# Patient Record
Sex: FEMALE | Race: OTHER | NOT HISPANIC OR LATINO
[De-identification: names, ages, dates, MRNs, and addresses within clinical notes are randomized per-mention and may not be internally consistent; named-entity substitution may affect disease eponyms.]

---

## 2022-02-09 PROBLEM — Z00.00 ENCOUNTER FOR PREVENTIVE HEALTH EXAMINATION: Status: ACTIVE | Noted: 2022-02-09

## 2022-02-10 ENCOUNTER — APPOINTMENT (OUTPATIENT)
Dept: OTOLARYNGOLOGY | Facility: CLINIC | Age: 77
End: 2022-02-10
Payer: MEDICARE

## 2022-02-10 VITALS
DIASTOLIC BLOOD PRESSURE: 82 MMHG | BODY MASS INDEX: 29.27 KG/M2 | WEIGHT: 155 LBS | HEIGHT: 61 IN | HEART RATE: 84 BPM | SYSTOLIC BLOOD PRESSURE: 145 MMHG

## 2022-02-10 DIAGNOSIS — Z86.79 PERSONAL HISTORY OF OTHER DISEASES OF THE CIRCULATORY SYSTEM: ICD-10-CM

## 2022-02-10 DIAGNOSIS — Z86.39 PERSONAL HISTORY OF OTHER ENDOCRINE, NUTRITIONAL AND METABOLIC DISEASE: ICD-10-CM

## 2022-02-10 DIAGNOSIS — Z78.9 OTHER SPECIFIED HEALTH STATUS: ICD-10-CM

## 2022-02-10 PROCEDURE — 99203 OFFICE O/P NEW LOW 30 MIN: CPT | Mod: 25

## 2022-02-10 PROCEDURE — 92557 COMPREHENSIVE HEARING TEST: CPT

## 2022-02-10 PROCEDURE — 92567 TYMPANOMETRY: CPT

## 2022-02-10 PROCEDURE — G0268 REMOVAL OF IMPACTED WAX MD: CPT

## 2022-02-10 RX ORDER — DAPAGLIFLOZIN 10 MG/1
10 TABLET, FILM COATED ORAL
Refills: 0 | Status: ACTIVE | COMMUNITY

## 2022-02-10 RX ORDER — ATORVASTATIN CALCIUM 20 MG/1
20 TABLET, FILM COATED ORAL
Refills: 0 | Status: ACTIVE | COMMUNITY

## 2022-02-10 RX ORDER — METFORMIN HYDROCHLORIDE 1000 MG/1
1000 TABLET, COATED ORAL
Refills: 0 | Status: ACTIVE | COMMUNITY

## 2022-02-10 NOTE — HISTORY OF PRESENT ILLNESS
[de-identified] : Camila Malloy is a 75 yo female with hx HTN and DM who presents for evaluation of hearing loss. She notes gradually worsening hearing loss over the past two years. She does not wear hearing aids. She denies tinnitus or vertigo. She denies otalgia or otorrhea. She denies history of recurrent ear infections. She denies recent fevers or chills.

## 2022-02-10 NOTE — ASSESSMENT
[FreeTextEntry1] : Camila Malloy presents for hearing evaluation. Bilateral cerumen impaction was noted and removed. Audiogram showed type C tymps AU and essentially moderate to moderately severe SNHL. There was a slight mixed component AU at 250 Hz. Amplificatoin was recommended and the patient would like to proceed with hearing aid evaluation. Will schedule this. Will also start flonase for eustachian tube dysfunction given her type C tymps.\par \par - Flonase 2 sprays daily to each nostril for 1 month.\par - Hearing aid evaluation.\par - Follow up in 1 year for surveillance audio.\par

## 2022-02-10 NOTE — DATA REVIEWED
[de-identified] : C/O: DIMINISHED HEARING \par -TYMPS: TYPE C AU\par -ESSENTIALLY MODERATE TO MOD-SEVERE SNHL 250-8000 HZ AU (SLIGHT MIXED COMPONENT  HZ AU)\par -POOR WRS AU\par RECS: 1) ENT F/U 2)RE-EVAL IN CONJUNCTION W/ MEDICAL MANAGEMENT 3)TRIAL WITH BINAURAL AMPLIFICATION STRONGLY RECOMMENDED

## 2022-02-10 NOTE — PHYSICAL EXAM
[Midline] : trachea located in midline position [Normal] : no rashes [de-identified] : Bilateral external ears wnl. Bilateral cerumen impaction.

## 2022-03-11 ENCOUNTER — APPOINTMENT (OUTPATIENT)
Dept: PHARMACY | Facility: CLINIC | Age: 77
End: 2022-03-11

## 2022-03-17 ENCOUNTER — APPOINTMENT (OUTPATIENT)
Dept: PHARMACY | Facility: CLINIC | Age: 77
End: 2022-03-17
Payer: SELF-PAY

## 2022-03-17 PROCEDURE — V5010 ASSESSMENT FOR HEARING AID: CPT | Mod: NC

## 2022-05-11 ENCOUNTER — APPOINTMENT (OUTPATIENT)
Dept: PHARMACY | Facility: CLINIC | Age: 77
End: 2022-05-11
Payer: COMMERCIAL

## 2022-05-11 PROCEDURE — V5261J: CUSTOM

## 2022-05-26 ENCOUNTER — APPOINTMENT (OUTPATIENT)
Dept: PHARMACY | Facility: CLINIC | Age: 77
End: 2022-05-26
Payer: SELF-PAY

## 2022-05-26 PROCEDURE — V5299A: CUSTOM | Mod: NC

## 2022-05-27 ENCOUNTER — APPOINTMENT (OUTPATIENT)
Dept: OTOLARYNGOLOGY | Facility: CLINIC | Age: 77
End: 2022-05-27
Payer: MEDICARE

## 2022-05-27 VITALS
DIASTOLIC BLOOD PRESSURE: 74 MMHG | HEART RATE: 98 BPM | HEIGHT: 61 IN | SYSTOLIC BLOOD PRESSURE: 138 MMHG | WEIGHT: 155 LBS | BODY MASS INDEX: 29.27 KG/M2

## 2022-05-27 PROCEDURE — G0268 REMOVAL OF IMPACTED WAX MD: CPT

## 2022-05-27 PROCEDURE — 99212 OFFICE O/P EST SF 10 MIN: CPT | Mod: 25

## 2022-05-27 RX ORDER — FAMOTIDINE 20 MG
TABLET ORAL
Refills: 0 | Status: COMPLETED | COMMUNITY
End: 2022-05-27

## 2022-05-27 NOTE — HISTORY OF PRESENT ILLNESS
[de-identified] : Camila Malloy is a 75 yo female with hx HTN and DM who presents for evaluation of hearing loss. She notes gradually worsening hearing loss over the past two years. She does not wear hearing aids. She denies tinnitus or vertigo. She denies otalgia or otorrhea. She denies history of recurrent ear infections. She denies recent fevers or chills. [FreeTextEntry1] : 5/27/22 - Ms. Malloy presents for follow-up. Since last visit, she obtained bilateral hearing aids. She has been seen by audiology and was noted to have cerumen impaction. She notes that she is having issues with her right hearing aid and feels decreased benefit from it. She denies otalgia, otorrhea, tinnitus, or vertigo. She dneies fevers or chills.

## 2022-05-27 NOTE — PHYSICAL EXAM
[de-identified] : Bilateral external ears wnl. Bilateral cerumen impaction. Wearing bilateral hearing aids. [Midline] : trachea located in midline position [Normal] : no rashes

## 2022-05-27 NOTE — ASSESSMENT
[FreeTextEntry1] : Camila Malloy presents for follow-up. At last visit, she was diagnosed with bilateral sensorineural hearing loss and eustachian tube dysfunction. She has obtained bilateral hearing aids. Bilateral cerumen was removed today. She will make an appointment for repeat hearing aid check.\par \par - Follow up in 3 months.

## 2022-07-26 ENCOUNTER — APPOINTMENT (OUTPATIENT)
Dept: OTOLARYNGOLOGY | Facility: CLINIC | Age: 77
End: 2022-07-26

## 2022-07-26 VITALS
WEIGHT: 146 LBS | HEART RATE: 96 BPM | DIASTOLIC BLOOD PRESSURE: 68 MMHG | BODY MASS INDEX: 27.21 KG/M2 | HEIGHT: 61.5 IN | SYSTOLIC BLOOD PRESSURE: 132 MMHG

## 2022-07-26 PROCEDURE — 99212 OFFICE O/P EST SF 10 MIN: CPT | Mod: 25

## 2022-07-26 PROCEDURE — G0268 REMOVAL OF IMPACTED WAX MD: CPT

## 2022-07-26 RX ORDER — CYCLOSPORINE 0.5 MG/ML
0.05 EMULSION OPHTHALMIC
Qty: 180 | Refills: 0 | Status: ACTIVE | COMMUNITY
Start: 2021-11-01

## 2022-07-26 RX ORDER — ORAL SEMAGLUTIDE 7 MG/1
7 TABLET ORAL
Qty: 90 | Refills: 0 | Status: ACTIVE | COMMUNITY
Start: 2021-06-02

## 2022-07-26 RX ORDER — AMLODIPINE AND OLMESARTAN MEDOXOMIL 5; 40 MG/1; MG/1
5-40 TABLET ORAL
Qty: 90 | Refills: 0 | Status: ACTIVE | COMMUNITY
Start: 2022-02-05

## 2022-07-26 RX ORDER — IBANDRONATE SODIUM 150 MG/1
150 TABLET ORAL
Qty: 3 | Refills: 0 | Status: ACTIVE | COMMUNITY
Start: 2021-12-15

## 2022-07-26 RX ORDER — MELOXICAM 15 MG/1
15 TABLET ORAL
Qty: 30 | Refills: 0 | Status: ACTIVE | COMMUNITY
Start: 2022-07-25

## 2022-07-26 RX ORDER — ERYTHROMYCIN 5 MG/G
5 OINTMENT OPHTHALMIC
Qty: 4 | Refills: 0 | Status: ACTIVE | COMMUNITY
Start: 2022-06-27

## 2022-07-26 RX ORDER — SITAGLIPTIN 100 MG/1
100 TABLET, FILM COATED ORAL
Qty: 90 | Refills: 0 | Status: ACTIVE | COMMUNITY
Start: 2021-11-17

## 2022-07-26 RX ORDER — ATORVASTATIN CALCIUM 10 MG/1
10 TABLET, FILM COATED ORAL
Qty: 90 | Refills: 0 | Status: ACTIVE | COMMUNITY
Start: 2022-03-16

## 2022-07-26 RX ORDER — METFORMIN ER 500 MG 500 MG/1
500 TABLET ORAL
Qty: 360 | Refills: 0 | Status: ACTIVE | COMMUNITY
Start: 2022-07-25

## 2022-07-26 NOTE — HISTORY OF PRESENT ILLNESS
[de-identified] : Camila Malloy is a 75 yo female with hx HTN and DM who presents for evaluation of hearing loss. She notes gradually worsening hearing loss over the past two years. She does not wear hearing aids. She denies tinnitus or vertigo. She denies otalgia or otorrhea. She denies history of recurrent ear infections. She denies recent fevers or chills. [FreeTextEntry1] : 5/27/22 - Ms. Malloy presents for follow-up. Since last visit, she obtained bilateral hearing aids. She has been seen by audiology and was noted to have cerumen impaction. She notes that she is having issues with her right hearing aid and feels decreased benefit from it. She denies otalgia, otorrhea, tinnitus, or vertigo. She dneies fevers or chills.\par \par 7/26/22 - Ms. Malloy presents for follow-up. She is wearing her hearing aids. She denies otalgia, otorrhea, tinnitus, vertigo, hearing change. She denies fevers, chills, or recent ear infections.

## 2022-07-26 NOTE — ASSESSMENT
[FreeTextEntry1] : Camila Malloy presents for follow-up. She has known bilateral sensorineural hearing loss and eustachian tube dysfunction. Bilateral cerumen was removed today. She is wearing her hearing aids and will go for hearing aid check.\par \par - Hearing aid check.\par - Follow up in 3 months.

## 2022-07-26 NOTE — PHYSICAL EXAM
[de-identified] : Bilateral external ears wnl. Bilateral cerumen impaction. Wearing bilateral hearing aids. [Midline] : trachea located in midline position [Normal] : no rashes

## 2022-08-01 ENCOUNTER — APPOINTMENT (OUTPATIENT)
Dept: PHARMACY | Facility: CLINIC | Age: 77
End: 2022-08-01

## 2022-08-01 PROCEDURE — V5267B: CUSTOM

## 2022-08-01 PROCEDURE — V5265A: CUSTOM

## 2022-08-18 ENCOUNTER — APPOINTMENT (OUTPATIENT)
Dept: PHARMACY | Facility: CLINIC | Age: 77
End: 2022-08-18

## 2022-08-18 PROCEDURE — V5267B: CUSTOM

## 2022-08-18 PROCEDURE — V5265A: CUSTOM

## 2022-08-19 ENCOUNTER — APPOINTMENT (OUTPATIENT)
Dept: OTOLARYNGOLOGY | Facility: CLINIC | Age: 77
End: 2022-08-19

## 2022-09-01 ENCOUNTER — APPOINTMENT (OUTPATIENT)
Dept: PHARMACY | Facility: CLINIC | Age: 77
End: 2022-09-01

## 2022-09-01 PROCEDURE — V5265A: CUSTOM

## 2022-09-01 PROCEDURE — V5267D: CUSTOM

## 2022-09-29 ENCOUNTER — APPOINTMENT (OUTPATIENT)
Dept: PHARMACY | Facility: CLINIC | Age: 77
End: 2022-09-29

## 2022-09-29 PROCEDURE — V5265A: CUSTOM

## 2022-09-29 PROCEDURE — V5267D: CUSTOM

## 2022-10-26 ENCOUNTER — APPOINTMENT (OUTPATIENT)
Dept: OTOLARYNGOLOGY | Facility: CLINIC | Age: 77
End: 2022-10-26

## 2022-10-26 ENCOUNTER — APPOINTMENT (OUTPATIENT)
Dept: PHARMACY | Facility: CLINIC | Age: 77
End: 2022-10-26

## 2022-10-26 VITALS
BODY MASS INDEX: 25.53 KG/M2 | HEART RATE: 91 BPM | DIASTOLIC BLOOD PRESSURE: 67 MMHG | SYSTOLIC BLOOD PRESSURE: 112 MMHG | HEIGHT: 61.5 IN | WEIGHT: 137 LBS

## 2022-10-26 DIAGNOSIS — T16.2XXA FOREIGN BODY IN LEFT EAR, INITIAL ENCOUNTER: ICD-10-CM

## 2022-10-26 PROCEDURE — V5265A: CUSTOM

## 2022-10-26 PROCEDURE — V5267D: CUSTOM

## 2022-10-26 PROCEDURE — 99213 OFFICE O/P EST LOW 20 MIN: CPT | Mod: 25

## 2022-10-26 PROCEDURE — 69200 CLEAR OUTER EAR CANAL: CPT

## 2022-10-26 NOTE — ASSESSMENT
[FreeTextEntry1] : Camila Malloy presents for follow-up. She has history of bilateral sensorineural hearing loss and wears hearing aids. Bilateral cerumen was removed today. Left ear foreign body was removed from external auditory canal, found to be a hearing aid bud. She will see audiology regarding her hearing aid issues.\par \par - Follow up in 3 months. Audio at that time.

## 2022-10-26 NOTE — PHYSICAL EXAM
[de-identified] : Bilateral external ears wnl. Wearing bilateral hearing aids. Bilateral cerumen impaction. Cerumen removed. Clear foreign body in left EAC.  [Midline] : trachea located in midline position [Normal] : no rashes

## 2022-10-26 NOTE — HISTORY OF PRESENT ILLNESS
[de-identified] : Camila Malloy is a 77 yo female with hx HTN and DM who presents for evaluation of hearing loss. She notes gradually worsening hearing loss over the past two years. She does not wear hearing aids. She denies tinnitus or vertigo. She denies otalgia or otorrhea. She denies history of recurrent ear infections. She denies recent fevers or chills. [FreeTextEntry1] : 5/27/22 - Ms. Malloy presents for follow-up. Since last visit, she obtained bilateral hearing aids. She has been seen by audiology and was noted to have cerumen impaction. She notes that she is having issues with her right hearing aid and feels decreased benefit from it. She denies otalgia, otorrhea, tinnitus, or vertigo. She dneies fevers or chills.\par \par 7/26/22 - Ms. Malloy presents for follow-up. She is wearing her hearing aids. She denies otalgia, otorrhea, tinnitus, vertigo, hearing change. She denies fevers, chills, or recent ear infections.\par \par 10/26/22 - Ms. Malloy presents for follow-up. She notes occasional issues with her hearing aids. She is going for hearing aid check. She notes difficulty hearing in crowded rooms. She denies otalgia, otorrhea, tinnitus ,vertigo. She denies fevers, chills, or recent ear infections.

## 2023-02-22 ENCOUNTER — APPOINTMENT (OUTPATIENT)
Dept: OTOLARYNGOLOGY | Facility: CLINIC | Age: 78
End: 2023-02-22
Payer: MEDICARE

## 2023-02-22 ENCOUNTER — APPOINTMENT (OUTPATIENT)
Dept: PHARMACY | Facility: CLINIC | Age: 78
End: 2023-02-22
Payer: MEDICARE

## 2023-02-22 VITALS
SYSTOLIC BLOOD PRESSURE: 149 MMHG | BODY MASS INDEX: 25.49 KG/M2 | DIASTOLIC BLOOD PRESSURE: 77 MMHG | HEART RATE: 85 BPM | HEIGHT: 61 IN | WEIGHT: 135 LBS

## 2023-02-22 PROCEDURE — V5267D: CUSTOM | Mod: GY

## 2023-02-22 PROCEDURE — V5265A: CUSTOM | Mod: GY

## 2023-02-22 PROCEDURE — 92567 TYMPANOMETRY: CPT

## 2023-02-22 PROCEDURE — 99213 OFFICE O/P EST LOW 20 MIN: CPT | Mod: 25

## 2023-02-22 PROCEDURE — G0268 REMOVAL OF IMPACTED WAX MD: CPT

## 2023-02-22 PROCEDURE — V5299A: CUSTOM | Mod: NC

## 2023-02-22 PROCEDURE — 92557 COMPREHENSIVE HEARING TEST: CPT

## 2023-02-22 NOTE — HISTORY OF PRESENT ILLNESS
[de-identified] : Camila Malloy is a 75 yo female with hx HTN and DM who presents for evaluation of hearing loss. She notes gradually worsening hearing loss over the past two years. She does not wear hearing aids. She denies tinnitus or vertigo. She denies otalgia or otorrhea. She denies history of recurrent ear infections. She denies recent fevers or chills. [FreeTextEntry1] : 5/27/22 - Ms. Malloy presents for follow-up. Since last visit, she obtained bilateral hearing aids. She has been seen by audiology and was noted to have cerumen impaction. She notes that she is having issues with her right hearing aid and feels decreased benefit from it. She denies otalgia, otorrhea, tinnitus, or vertigo. She dneies fevers or chills.\par \par 7/26/22 - Ms. Malloy presents for follow-up. She is wearing her hearing aids. She denies otalgia, otorrhea, tinnitus, vertigo, hearing change. She denies fevers, chills, or recent ear infections.\par \par 10/26/22 - Ms. Malloy presents for follow-up. She notes occasional issues with her hearing aids. She is going for hearing aid check. She notes difficulty hearing in crowded rooms. She denies otalgia, otorrhea, tinnitus ,vertigo. She denies fevers, chills, or recent ear infections.\par \par 2/22/23 - Ms. Malloy presnets for follow-up. She continues to wear her hearing aids. She notes some left ear soreness today, otherwise no otalgia. She denies otorrhea, tinnitus, vertigo. She denies hearing change. She denies fevers, chills, or recent ear infections.

## 2023-02-22 NOTE — ASSESSMENT
[FreeTextEntry1] : Camila Malloy presents for follow-up. She wears bilateral hearing aids. Bilateral cerumen impaction was removed today. Audiogram was performed and reviewed today showing type C tymps AU and moderately severe to moderate sensorineural hearing loss AU, stable from prior. She will go for hearing aid check today.\par \par - Follow up in 6 months. Audio in 1 year.\par \par

## 2023-02-22 NOTE — PHYSICAL EXAM
[Midline] : trachea located in midline position [Normal] : no rashes [de-identified] : Wearing bilateral hearing aids. Bilateral cerumen impaction.removed

## 2023-02-28 ENCOUNTER — APPOINTMENT (OUTPATIENT)
Dept: PHARMACY | Facility: CLINIC | Age: 78
End: 2023-02-28

## 2023-05-22 ENCOUNTER — APPOINTMENT (OUTPATIENT)
Dept: PHARMACY | Facility: CLINIC | Age: 78
End: 2023-05-22

## 2023-06-21 ENCOUNTER — APPOINTMENT (OUTPATIENT)
Dept: PHARMACY | Facility: CLINIC | Age: 78
End: 2023-06-21
Payer: SELF-PAY

## 2023-06-21 PROCEDURE — V5265A: CUSTOM

## 2023-06-21 PROCEDURE — V5267D: CUSTOM

## 2023-08-22 ENCOUNTER — APPOINTMENT (OUTPATIENT)
Dept: OTOLARYNGOLOGY | Facility: CLINIC | Age: 78
End: 2023-08-22
Payer: MEDICARE

## 2023-08-22 ENCOUNTER — APPOINTMENT (OUTPATIENT)
Dept: PHARMACY | Facility: CLINIC | Age: 78
End: 2023-08-22
Payer: MEDICARE

## 2023-08-22 VITALS
WEIGHT: 134 LBS | HEIGHT: 61 IN | SYSTOLIC BLOOD PRESSURE: 124 MMHG | DIASTOLIC BLOOD PRESSURE: 76 MMHG | HEART RATE: 81 BPM | BODY MASS INDEX: 25.3 KG/M2

## 2023-08-22 PROCEDURE — 99212 OFFICE O/P EST SF 10 MIN: CPT | Mod: 25

## 2023-08-22 PROCEDURE — 69210 REMOVE IMPACTED EAR WAX UNI: CPT

## 2023-08-22 PROCEDURE — V5299A: CUSTOM

## 2023-08-22 NOTE — ASSESSMENT
[FreeTextEntry1] : Camila Malloy presents for follow-up. She wears bilateral hearing aids. Bilateral cerumen impaction was removed today. Audiogram at last visit showed type C tymps AU and moderately severe to moderate sensorineural hearing loss AU, stable from prior. She has appointment for hearing aid check today.  - Follow up in 6 months w/ audio.

## 2023-08-22 NOTE — PHYSICAL EXAM
[de-identified] : Wearing bilateral hearing aids. Bilateral cerumen impaction [Midline] : trachea located in midline position [Normal] : no rashes

## 2023-08-22 NOTE — HISTORY OF PRESENT ILLNESS
[de-identified] : Camila Malloy is a 77 yo female with hx HTN and DM who presents for evaluation of hearing loss. She notes gradually worsening hearing loss over the past two years. She does not wear hearing aids. She denies tinnitus or vertigo. She denies otalgia or otorrhea. She denies history of recurrent ear infections. She denies recent fevers or chills. [FreeTextEntry1] : 5/27/22 - Ms. Malloy presents for follow-up. Since last visit, she obtained bilateral hearing aids. She has been seen by audiology and was noted to have cerumen impaction. She notes that she is having issues with her right hearing aid and feels decreased benefit from it. She denies otalgia, otorrhea, tinnitus, or vertigo. She dneies fevers or chills.  7/26/22 - Ms. Malloy presents for follow-up. She is wearing her hearing aids. She denies otalgia, otorrhea, tinnitus, vertigo, hearing change. She denies fevers, chills, or recent ear infections.  10/26/22 - Ms. Malloy presents for follow-up. She notes occasional issues with her hearing aids. She is going for hearing aid check. She notes difficulty hearing in crowded rooms. She denies otalgia, otorrhea, tinnitus ,vertigo. She denies fevers, chills, or recent ear infections.  2/22/23 - Ms. Malloy presnets for follow-up. She continues to wear her hearing aids. She notes some left ear soreness today, otherwise no otalgia. She denies otorrhea, tinnitus, vertigo. She denies hearing change. She denies fevers, chills, or recent ear infections.  8/22/23 - Camila Malloy presents for follow-up. She is wearing her hearing aids. She notes some bilateral tinnitus. She denies otalgia, otorrhea, vertigo, or change in hearing. No fevers or chills. No recent ear infections.

## 2023-09-20 ENCOUNTER — APPOINTMENT (OUTPATIENT)
Dept: PHARMACY | Facility: CLINIC | Age: 78
End: 2023-09-20
Payer: SELF-PAY

## 2023-09-20 PROCEDURE — V5265A: CUSTOM

## 2023-10-13 ENCOUNTER — OUTPATIENT (OUTPATIENT)
Dept: OUTPATIENT SERVICES | Facility: HOSPITAL | Age: 78
LOS: 1 days | End: 2023-10-13
Payer: MEDICARE

## 2023-10-13 VITALS
HEART RATE: 90 BPM | TEMPERATURE: 99 F | RESPIRATION RATE: 16 BRPM | DIASTOLIC BLOOD PRESSURE: 60 MMHG | OXYGEN SATURATION: 96 % | SYSTOLIC BLOOD PRESSURE: 120 MMHG | WEIGHT: 134.04 LBS

## 2023-10-13 DIAGNOSIS — Z98.49 CATARACT EXTRACTION STATUS, UNSPECIFIED EYE: Chronic | ICD-10-CM

## 2023-10-13 DIAGNOSIS — Z01.818 ENCOUNTER FOR OTHER PREPROCEDURAL EXAMINATION: ICD-10-CM

## 2023-10-13 DIAGNOSIS — M17.11 UNILATERAL PRIMARY OSTEOARTHRITIS, RIGHT KNEE: ICD-10-CM

## 2023-10-13 DIAGNOSIS — E11.9 TYPE 2 DIABETES MELLITUS WITHOUT COMPLICATIONS: ICD-10-CM

## 2023-10-13 DIAGNOSIS — Z98.89 OTHER SPECIFIED POSTPROCEDURAL STATES: Chronic | ICD-10-CM

## 2023-10-13 LAB
ALBUMIN SERPL ELPH-MCNC: 3.8 G/DL — SIGNIFICANT CHANGE UP (ref 3.3–5)
ALP SERPL-CCNC: 85 U/L — SIGNIFICANT CHANGE UP (ref 40–120)
ALT FLD-CCNC: 23 U/L — SIGNIFICANT CHANGE UP (ref 12–78)
ANION GAP SERPL CALC-SCNC: 4 MMOL/L — LOW (ref 5–17)
APTT BLD: 26.6 SEC — SIGNIFICANT CHANGE UP (ref 24.5–35.6)
AST SERPL-CCNC: 11 U/L — LOW (ref 15–37)
BILIRUB SERPL-MCNC: 0.5 MG/DL — SIGNIFICANT CHANGE UP (ref 0.2–1.2)
BLD GP AB SCN SERPL QL: SIGNIFICANT CHANGE UP
BUN SERPL-MCNC: 15 MG/DL — SIGNIFICANT CHANGE UP (ref 7–23)
CALCIUM SERPL-MCNC: 9.4 MG/DL — SIGNIFICANT CHANGE UP (ref 8.5–10.1)
CHLORIDE SERPL-SCNC: 109 MMOL/L — HIGH (ref 96–108)
CO2 SERPL-SCNC: 29 MMOL/L — SIGNIFICANT CHANGE UP (ref 22–31)
CREAT SERPL-MCNC: 0.72 MG/DL — SIGNIFICANT CHANGE UP (ref 0.5–1.3)
EGFR: 87 ML/MIN/1.73M2 — SIGNIFICANT CHANGE UP
GLUCOSE SERPL-MCNC: 189 MG/DL — HIGH (ref 70–99)
HCT VFR BLD CALC: 42.2 % — SIGNIFICANT CHANGE UP (ref 34.5–45)
HGB BLD-MCNC: 13.6 G/DL — SIGNIFICANT CHANGE UP (ref 11.5–15.5)
INR BLD: 0.94 RATIO — SIGNIFICANT CHANGE UP (ref 0.85–1.18)
MCHC RBC-ENTMCNC: 28.5 PG — SIGNIFICANT CHANGE UP (ref 27–34)
MCHC RBC-ENTMCNC: 32.2 GM/DL — SIGNIFICANT CHANGE UP (ref 32–36)
MCV RBC AUTO: 88.5 FL — SIGNIFICANT CHANGE UP (ref 80–100)
NRBC # BLD: 0 /100 WBCS — SIGNIFICANT CHANGE UP (ref 0–0)
PLATELET # BLD AUTO: 272 K/UL — SIGNIFICANT CHANGE UP (ref 150–400)
POTASSIUM SERPL-MCNC: 3.6 MMOL/L — SIGNIFICANT CHANGE UP (ref 3.5–5.3)
POTASSIUM SERPL-SCNC: 3.6 MMOL/L — SIGNIFICANT CHANGE UP (ref 3.5–5.3)
PROT SERPL-MCNC: 7.5 G/DL — SIGNIFICANT CHANGE UP (ref 6–8.3)
PROTHROM AB SERPL-ACNC: 11 SEC — SIGNIFICANT CHANGE UP (ref 9.5–13)
RBC # BLD: 4.77 M/UL — SIGNIFICANT CHANGE UP (ref 3.8–5.2)
RBC # FLD: 15.7 % — HIGH (ref 10.3–14.5)
SODIUM SERPL-SCNC: 142 MMOL/L — SIGNIFICANT CHANGE UP (ref 135–145)
WBC # BLD: 11.46 K/UL — HIGH (ref 3.8–10.5)
WBC # FLD AUTO: 11.46 K/UL — HIGH (ref 3.8–10.5)

## 2023-10-13 PROCEDURE — 85027 COMPLETE CBC AUTOMATED: CPT

## 2023-10-13 PROCEDURE — G0463: CPT

## 2023-10-13 PROCEDURE — 86901 BLOOD TYPING SEROLOGIC RH(D): CPT

## 2023-10-13 PROCEDURE — 73560 X-RAY EXAM OF KNEE 1 OR 2: CPT | Mod: 26,RT

## 2023-10-13 PROCEDURE — 93005 ELECTROCARDIOGRAM TRACING: CPT

## 2023-10-13 PROCEDURE — 93010 ELECTROCARDIOGRAM REPORT: CPT

## 2023-10-13 PROCEDURE — 73560 X-RAY EXAM OF KNEE 1 OR 2: CPT

## 2023-10-13 PROCEDURE — 83036 HEMOGLOBIN GLYCOSYLATED A1C: CPT

## 2023-10-13 PROCEDURE — 36415 COLL VENOUS BLD VENIPUNCTURE: CPT

## 2023-10-13 PROCEDURE — 86850 RBC ANTIBODY SCREEN: CPT

## 2023-10-13 PROCEDURE — 85730 THROMBOPLASTIN TIME PARTIAL: CPT

## 2023-10-13 PROCEDURE — 85610 PROTHROMBIN TIME: CPT

## 2023-10-13 PROCEDURE — 87640 STAPH A DNA AMP PROBE: CPT

## 2023-10-13 PROCEDURE — 80053 COMPREHEN METABOLIC PANEL: CPT

## 2023-10-13 PROCEDURE — 87641 MR-STAPH DNA AMP PROBE: CPT

## 2023-10-13 PROCEDURE — 86900 BLOOD TYPING SEROLOGIC ABO: CPT

## 2023-10-13 RX ORDER — DEXTROSE 50 % IN WATER 50 %
25 SYRINGE (ML) INTRAVENOUS ONCE
Refills: 0 | Status: DISCONTINUED | OUTPATIENT
Start: 2023-10-23 | End: 2023-11-06

## 2023-10-13 RX ORDER — GLUCAGON INJECTION, SOLUTION 0.5 MG/.1ML
1 INJECTION, SOLUTION SUBCUTANEOUS ONCE
Refills: 0 | Status: DISCONTINUED | OUTPATIENT
Start: 2023-10-23 | End: 2023-11-06

## 2023-10-13 RX ORDER — DEXTROSE 50 % IN WATER 50 %
12.5 SYRINGE (ML) INTRAVENOUS ONCE
Refills: 0 | Status: DISCONTINUED | OUTPATIENT
Start: 2023-10-23 | End: 2023-11-06

## 2023-10-13 RX ORDER — DEXTROSE 50 % IN WATER 50 %
15 SYRINGE (ML) INTRAVENOUS ONCE
Refills: 0 | Status: DISCONTINUED | OUTPATIENT
Start: 2023-10-23 | End: 2023-11-06

## 2023-10-13 NOTE — H&P PST ADULT - HISTORY OF PRESENT ILLNESS
79 y/o female with P  Pt complaining of right knee pain for the last 2 years especially going up and down stairs as well ambulating.   Pt diagnosed with OA of right knee and with unsteady and limping gait. Pt denies right knee swelling.  79 y/o female with PMH of Type 2 DM and HTN here for PST. Pt complaining of right knee pain for the last 2 years especially going up and down stairs as well ambulating. Pt diagnosed with OA of right knee and with unsteady and limping gait. Pt denies right knee swelling. Pt electing for right total knee replacement on 10/23/23.  79 y/o female with PMH of Type 2 DM and HTN here for PST. Pt complaining of right knee pain for the last 2 years especially going up and down stairs as well ambulating. Pt diagnosed with OA of right knee with unsteady and limping gait. Pt denies right knee swelling. Pt electing for right total knee replacement on 10/23/23.

## 2023-10-13 NOTE — H&P PST ADULT - NSHP PST DIAGEKG REVIEWED YN_GEN_A_CORE
[FreeTextEntry1] : Wound veil, dry dressing to knee, return to office in two weeks.\par 25 minutes spend with the patient.\par 
Yes

## 2023-10-13 NOTE — H&P PST ADULT - NSANTHOSAYNRD_GEN_A_CORE
No. OFE screening performed.  STOP BANG Legend: 0-2 = LOW Risk; 3-4 = INTERMEDIATE Risk; 5-8 = HIGH Risk

## 2023-10-13 NOTE — H&P PST ADULT - MUSCULOSKELETAL COMMENTS
See HPI, (+) scoliosis Right knee - decreased ROM, (+) tenderness to medial aspect, decreased strength, (+) edema, no erythema

## 2023-10-13 NOTE — H&P PST ADULT - PROBLEM SELECTOR PLAN 3
Medical clearance needed.   CBC, Comprehensive panel, PT/PTT, T&S, HgbA1c, EKG, Nose culture and X-ray of right knee ordered.   Pre-op instructions and 3 days of surgical scrubs given and pt verbalized understanding.

## 2023-10-13 NOTE — H&P PST ADULT - NSICDXPASTMEDICALHX_GEN_ALL_CORE_FT
PAST MEDICAL HISTORY:  Hypertension      PAST MEDICAL HISTORY:  Hypertension     Type 2 diabetes mellitus     Unilateral primary osteoarthritis, right knee

## 2023-10-13 NOTE — H&P PST ADULT - PROBLEM SELECTOR PLAN 2
Instructed pt of need for endocrine clearance if HgbA1c is 9 or greater. No Metformin 24 hours before and morning of surgery. Last Farxiga on 10/20/23. Last dose of Rybelsus on 10/16/23. Pt verbalized understanding.

## 2023-10-14 LAB
A1C WITH ESTIMATED AVERAGE GLUCOSE RESULT: 6.1 % — HIGH (ref 4–5.6)
ESTIMATED AVERAGE GLUCOSE: 128 MG/DL — HIGH (ref 68–114)
MRSA PCR RESULT.: SIGNIFICANT CHANGE UP
S AUREUS DNA NOSE QL NAA+PROBE: SIGNIFICANT CHANGE UP

## 2023-10-18 ENCOUNTER — APPOINTMENT (OUTPATIENT)
Dept: PHARMACY | Facility: CLINIC | Age: 78
End: 2023-10-18
Payer: SELF-PAY

## 2023-10-18 PROBLEM — M17.11 UNILATERAL PRIMARY OSTEOARTHRITIS, RIGHT KNEE: Chronic | Status: ACTIVE | Noted: 2023-10-13

## 2023-10-18 PROBLEM — E11.9 TYPE 2 DIABETES MELLITUS WITHOUT COMPLICATIONS: Chronic | Status: ACTIVE | Noted: 2023-10-13

## 2023-10-18 PROCEDURE — V5267D: CUSTOM

## 2023-10-22 ENCOUNTER — TRANSCRIPTION ENCOUNTER (OUTPATIENT)
Age: 78
End: 2023-10-22

## 2023-10-22 RX ORDER — CELECOXIB 200 MG/1
2 CAPSULE ORAL
Refills: 0 | DISCHARGE
Start: 2023-10-22

## 2023-10-23 ENCOUNTER — OUTPATIENT (OUTPATIENT)
Dept: OUTPATIENT SERVICES | Facility: HOSPITAL | Age: 78
LOS: 1 days | End: 2023-10-23
Payer: MEDICARE

## 2023-10-23 ENCOUNTER — TRANSCRIPTION ENCOUNTER (OUTPATIENT)
Age: 78
End: 2023-10-23

## 2023-10-23 VITALS
WEIGHT: 145.06 LBS | HEART RATE: 82 BPM | OXYGEN SATURATION: 99 % | TEMPERATURE: 98 F | HEIGHT: 61 IN | RESPIRATION RATE: 16 BRPM | SYSTOLIC BLOOD PRESSURE: 162 MMHG | DIASTOLIC BLOOD PRESSURE: 68 MMHG

## 2023-10-23 DIAGNOSIS — M19.90 UNSPECIFIED OSTEOARTHRITIS, UNSPECIFIED SITE: ICD-10-CM

## 2023-10-23 DIAGNOSIS — E78.00 PURE HYPERCHOLESTEROLEMIA, UNSPECIFIED: ICD-10-CM

## 2023-10-23 DIAGNOSIS — Z98.89 OTHER SPECIFIED POSTPROCEDURAL STATES: Chronic | ICD-10-CM

## 2023-10-23 DIAGNOSIS — Z98.49 CATARACT EXTRACTION STATUS, UNSPECIFIED EYE: Chronic | ICD-10-CM

## 2023-10-23 DIAGNOSIS — I10 ESSENTIAL (PRIMARY) HYPERTENSION: ICD-10-CM

## 2023-10-23 DIAGNOSIS — M17.11 UNILATERAL PRIMARY OSTEOARTHRITIS, RIGHT KNEE: ICD-10-CM

## 2023-10-23 DIAGNOSIS — E11.9 TYPE 2 DIABETES MELLITUS WITHOUT COMPLICATIONS: ICD-10-CM

## 2023-10-23 DIAGNOSIS — Z01.818 ENCOUNTER FOR OTHER PREPROCEDURAL EXAMINATION: ICD-10-CM

## 2023-10-23 LAB
ABO RH CONFIRMATION: SIGNIFICANT CHANGE UP
ABO RH CONFIRMATION: SIGNIFICANT CHANGE UP
ANION GAP SERPL CALC-SCNC: 10 MMOL/L — SIGNIFICANT CHANGE UP (ref 5–17)
ANION GAP SERPL CALC-SCNC: 10 MMOL/L — SIGNIFICANT CHANGE UP (ref 5–17)
BUN SERPL-MCNC: 16 MG/DL — SIGNIFICANT CHANGE UP (ref 7–23)
BUN SERPL-MCNC: 16 MG/DL — SIGNIFICANT CHANGE UP (ref 7–23)
CALCIUM SERPL-MCNC: 8.7 MG/DL — SIGNIFICANT CHANGE UP (ref 8.5–10.1)
CALCIUM SERPL-MCNC: 8.7 MG/DL — SIGNIFICANT CHANGE UP (ref 8.5–10.1)
CHLORIDE SERPL-SCNC: 110 MMOL/L — HIGH (ref 96–108)
CHLORIDE SERPL-SCNC: 110 MMOL/L — HIGH (ref 96–108)
CO2 SERPL-SCNC: 23 MMOL/L — SIGNIFICANT CHANGE UP (ref 22–31)
CO2 SERPL-SCNC: 23 MMOL/L — SIGNIFICANT CHANGE UP (ref 22–31)
CREAT SERPL-MCNC: 0.59 MG/DL — SIGNIFICANT CHANGE UP (ref 0.5–1.3)
CREAT SERPL-MCNC: 0.59 MG/DL — SIGNIFICANT CHANGE UP (ref 0.5–1.3)
EGFR: 93 ML/MIN/1.73M2 — SIGNIFICANT CHANGE UP
EGFR: 93 ML/MIN/1.73M2 — SIGNIFICANT CHANGE UP
GLUCOSE BLDC GLUCOMTR-MCNC: 121 MG/DL — HIGH (ref 70–99)
GLUCOSE BLDC GLUCOMTR-MCNC: 121 MG/DL — HIGH (ref 70–99)
GLUCOSE BLDC GLUCOMTR-MCNC: 163 MG/DL — HIGH (ref 70–99)
GLUCOSE BLDC GLUCOMTR-MCNC: 163 MG/DL — HIGH (ref 70–99)
GLUCOSE BLDC GLUCOMTR-MCNC: 172 MG/DL — HIGH (ref 70–99)
GLUCOSE BLDC GLUCOMTR-MCNC: 172 MG/DL — HIGH (ref 70–99)
GLUCOSE BLDC GLUCOMTR-MCNC: 222 MG/DL — HIGH (ref 70–99)
GLUCOSE BLDC GLUCOMTR-MCNC: 222 MG/DL — HIGH (ref 70–99)
GLUCOSE SERPL-MCNC: 239 MG/DL — HIGH (ref 70–99)
GLUCOSE SERPL-MCNC: 239 MG/DL — HIGH (ref 70–99)
HCT VFR BLD CALC: 40.9 % — SIGNIFICANT CHANGE UP (ref 34.5–45)
HCT VFR BLD CALC: 40.9 % — SIGNIFICANT CHANGE UP (ref 34.5–45)
HGB BLD-MCNC: 13 G/DL — SIGNIFICANT CHANGE UP (ref 11.5–15.5)
HGB BLD-MCNC: 13 G/DL — SIGNIFICANT CHANGE UP (ref 11.5–15.5)
MCHC RBC-ENTMCNC: 28.4 PG — SIGNIFICANT CHANGE UP (ref 27–34)
MCHC RBC-ENTMCNC: 28.4 PG — SIGNIFICANT CHANGE UP (ref 27–34)
MCHC RBC-ENTMCNC: 31.8 GM/DL — LOW (ref 32–36)
MCHC RBC-ENTMCNC: 31.8 GM/DL — LOW (ref 32–36)
MCV RBC AUTO: 89.3 FL — SIGNIFICANT CHANGE UP (ref 80–100)
MCV RBC AUTO: 89.3 FL — SIGNIFICANT CHANGE UP (ref 80–100)
NRBC # BLD: 0 /100 WBCS — SIGNIFICANT CHANGE UP (ref 0–0)
NRBC # BLD: 0 /100 WBCS — SIGNIFICANT CHANGE UP (ref 0–0)
PLATELET # BLD AUTO: 268 K/UL — SIGNIFICANT CHANGE UP (ref 150–400)
PLATELET # BLD AUTO: 268 K/UL — SIGNIFICANT CHANGE UP (ref 150–400)
POTASSIUM SERPL-MCNC: 3.6 MMOL/L — SIGNIFICANT CHANGE UP (ref 3.5–5.3)
POTASSIUM SERPL-MCNC: 3.6 MMOL/L — SIGNIFICANT CHANGE UP (ref 3.5–5.3)
POTASSIUM SERPL-SCNC: 3.6 MMOL/L — SIGNIFICANT CHANGE UP (ref 3.5–5.3)
POTASSIUM SERPL-SCNC: 3.6 MMOL/L — SIGNIFICANT CHANGE UP (ref 3.5–5.3)
RBC # BLD: 4.58 M/UL — SIGNIFICANT CHANGE UP (ref 3.8–5.2)
RBC # BLD: 4.58 M/UL — SIGNIFICANT CHANGE UP (ref 3.8–5.2)
RBC # FLD: 15.4 % — HIGH (ref 10.3–14.5)
RBC # FLD: 15.4 % — HIGH (ref 10.3–14.5)
SODIUM SERPL-SCNC: 143 MMOL/L — SIGNIFICANT CHANGE UP (ref 135–145)
SODIUM SERPL-SCNC: 143 MMOL/L — SIGNIFICANT CHANGE UP (ref 135–145)
WBC # BLD: 18.01 K/UL — HIGH (ref 3.8–10.5)
WBC # BLD: 18.01 K/UL — HIGH (ref 3.8–10.5)
WBC # FLD AUTO: 18.01 K/UL — HIGH (ref 3.8–10.5)
WBC # FLD AUTO: 18.01 K/UL — HIGH (ref 3.8–10.5)

## 2023-10-23 PROCEDURE — 73560 X-RAY EXAM OF KNEE 1 OR 2: CPT | Mod: 26,RT

## 2023-10-23 DEVICE — COMP ATTUNE FEM SZ5 NAR RT: Type: IMPLANTABLE DEVICE | Site: RIGHT | Status: FUNCTIONAL

## 2023-10-23 DEVICE — BASE TIBIAL ATTUNE FB CEMENTED SZ 4: Type: IMPLANTABLE DEVICE | Site: RIGHT | Status: FUNCTIONAL

## 2023-10-23 DEVICE — IMPLANTABLE DEVICE: Type: IMPLANTABLE DEVICE | Site: RIGHT | Status: FUNCTIONAL

## 2023-10-23 DEVICE — IMP PATELLA ATTUNE DOME MEDIAL 38MM: Type: IMPLANTABLE DEVICE | Site: RIGHT | Status: FUNCTIONAL

## 2023-10-23 DEVICE — ATTUNE PINNING SYSTEM: Type: IMPLANTABLE DEVICE | Site: RIGHT | Status: FUNCTIONAL

## 2023-10-23 RX ORDER — ACETAMINOPHEN 500 MG
650 TABLET ORAL EVERY 6 HOURS
Refills: 0 | Status: DISCONTINUED | OUTPATIENT
Start: 2023-10-24 | End: 2023-11-06

## 2023-10-23 RX ORDER — SODIUM CHLORIDE 9 MG/ML
1000 INJECTION, SOLUTION INTRAVENOUS
Refills: 0 | Status: DISCONTINUED | OUTPATIENT
Start: 2023-10-23 | End: 2023-10-23

## 2023-10-23 RX ORDER — SENNA PLUS 8.6 MG/1
2 TABLET ORAL AT BEDTIME
Refills: 0 | Status: DISCONTINUED | OUTPATIENT
Start: 2023-10-23 | End: 2023-11-06

## 2023-10-23 RX ORDER — SODIUM CHLORIDE 9 MG/ML
1000 INJECTION, SOLUTION INTRAVENOUS
Refills: 0 | Status: DISCONTINUED | OUTPATIENT
Start: 2023-10-23 | End: 2023-11-06

## 2023-10-23 RX ORDER — DEXTROSE 50 % IN WATER 50 %
25 SYRINGE (ML) INTRAVENOUS ONCE
Refills: 0 | Status: DISCONTINUED | OUTPATIENT
Start: 2023-10-23 | End: 2023-11-06

## 2023-10-23 RX ORDER — POLYETHYLENE GLYCOL 3350 17 G/17G
17 POWDER, FOR SOLUTION ORAL AT BEDTIME
Refills: 0 | Status: DISCONTINUED | OUTPATIENT
Start: 2023-10-23 | End: 2023-11-06

## 2023-10-23 RX ORDER — SODIUM CHLORIDE 9 MG/ML
1000 INJECTION INTRAMUSCULAR; INTRAVENOUS; SUBCUTANEOUS
Refills: 0 | Status: DISCONTINUED | OUTPATIENT
Start: 2023-10-23 | End: 2023-11-06

## 2023-10-23 RX ORDER — TRAMADOL HYDROCHLORIDE 50 MG/1
25 TABLET ORAL EVERY 6 HOURS
Refills: 0 | Status: DISCONTINUED | OUTPATIENT
Start: 2023-10-23 | End: 2023-10-23

## 2023-10-23 RX ORDER — HYDROMORPHONE HYDROCHLORIDE 2 MG/ML
0.25 INJECTION INTRAMUSCULAR; INTRAVENOUS; SUBCUTANEOUS EVERY 6 HOURS
Refills: 0 | Status: DISCONTINUED | OUTPATIENT
Start: 2023-10-23 | End: 2023-10-23

## 2023-10-23 RX ORDER — AMLODIPINE BESYLATE 2.5 MG/1
5 TABLET ORAL DAILY
Refills: 0 | Status: DISCONTINUED | OUTPATIENT
Start: 2023-10-24 | End: 2023-11-06

## 2023-10-23 RX ORDER — HYDROMORPHONE HYDROCHLORIDE 2 MG/ML
0.5 INJECTION INTRAMUSCULAR; INTRAVENOUS; SUBCUTANEOUS
Refills: 0 | Status: DISCONTINUED | OUTPATIENT
Start: 2023-10-23 | End: 2023-10-23

## 2023-10-23 RX ORDER — ONDANSETRON 8 MG/1
4 TABLET, FILM COATED ORAL EVERY 6 HOURS
Refills: 0 | Status: DISCONTINUED | OUTPATIENT
Start: 2023-10-23 | End: 2023-11-06

## 2023-10-23 RX ORDER — CELECOXIB 200 MG/1
200 CAPSULE ORAL EVERY 12 HOURS
Refills: 0 | Status: DISCONTINUED | OUTPATIENT
Start: 2023-10-24 | End: 2023-11-06

## 2023-10-23 RX ORDER — ACETAMINOPHEN 500 MG
1000 TABLET ORAL ONCE
Refills: 0 | Status: COMPLETED | OUTPATIENT
Start: 2023-10-23 | End: 2023-10-23

## 2023-10-23 RX ORDER — ASPIRIN/CALCIUM CARB/MAGNESIUM 324 MG
325 TABLET ORAL
Refills: 0 | Status: DISCONTINUED | OUTPATIENT
Start: 2023-10-24 | End: 2023-11-06

## 2023-10-23 RX ORDER — CYCLOSPORINE 0.5 MG/ML
1 EMULSION OPHTHALMIC
Refills: 0 | DISCHARGE

## 2023-10-23 RX ORDER — CALCIUM CARBONATE 500(1250)
2 TABLET ORAL
Refills: 0 | DISCHARGE

## 2023-10-23 RX ORDER — BUPIVACAINE 13.3 MG/ML
20 INJECTION, SUSPENSION, LIPOSOMAL INFILTRATION ONCE
Refills: 0 | Status: DISCONTINUED | OUTPATIENT
Start: 2023-10-23 | End: 2023-10-23

## 2023-10-23 RX ORDER — LOSARTAN POTASSIUM 100 MG/1
100 TABLET, FILM COATED ORAL DAILY
Refills: 0 | Status: DISCONTINUED | OUTPATIENT
Start: 2023-10-24 | End: 2023-11-06

## 2023-10-23 RX ORDER — DEXTROSE 50 % IN WATER 50 %
15 SYRINGE (ML) INTRAVENOUS ONCE
Refills: 0 | Status: DISCONTINUED | OUTPATIENT
Start: 2023-10-23 | End: 2023-11-06

## 2023-10-23 RX ORDER — INSULIN LISPRO 100/ML
VIAL (ML) SUBCUTANEOUS
Refills: 0 | Status: DISCONTINUED | OUTPATIENT
Start: 2023-10-23 | End: 2023-11-06

## 2023-10-23 RX ORDER — AMLODIPINE BESYLATE AND OLMESARTRAN MEDOXOMIL 10; 40 MG/1; MG/1
1 TABLET, FILM COATED ORAL
Refills: 0 | DISCHARGE

## 2023-10-23 RX ORDER — MAGNESIUM HYDROXIDE 400 MG/1
30 TABLET, CHEWABLE ORAL DAILY
Refills: 0 | Status: DISCONTINUED | OUTPATIENT
Start: 2023-10-23 | End: 2023-11-06

## 2023-10-23 RX ORDER — FAMOTIDINE 10 MG/ML
20 INJECTION INTRAVENOUS EVERY 12 HOURS
Refills: 0 | Status: DISCONTINUED | OUTPATIENT
Start: 2023-10-23 | End: 2023-11-06

## 2023-10-23 RX ORDER — ONDANSETRON 8 MG/1
4 TABLET, FILM COATED ORAL ONCE
Refills: 0 | Status: DISCONTINUED | OUTPATIENT
Start: 2023-10-23 | End: 2023-10-23

## 2023-10-23 RX ORDER — ATORVASTATIN CALCIUM 80 MG/1
10 TABLET, FILM COATED ORAL AT BEDTIME
Refills: 0 | Status: DISCONTINUED | OUTPATIENT
Start: 2023-10-23 | End: 2023-11-06

## 2023-10-23 RX ORDER — GLUCAGON INJECTION, SOLUTION 0.5 MG/.1ML
1 INJECTION, SOLUTION SUBCUTANEOUS ONCE
Refills: 0 | Status: DISCONTINUED | OUTPATIENT
Start: 2023-10-23 | End: 2023-11-06

## 2023-10-23 RX ORDER — CEFAZOLIN SODIUM 1 G
2000 VIAL (EA) INJECTION ONCE
Refills: 0 | Status: COMPLETED | OUTPATIENT
Start: 2023-10-23 | End: 2023-10-23

## 2023-10-23 RX ORDER — DEXTROSE 50 % IN WATER 50 %
12.5 SYRINGE (ML) INTRAVENOUS ONCE
Refills: 0 | Status: DISCONTINUED | OUTPATIENT
Start: 2023-10-23 | End: 2023-11-06

## 2023-10-23 RX ORDER — ACETAMINOPHEN 500 MG
1000 TABLET ORAL ONCE
Refills: 0 | Status: COMPLETED | OUTPATIENT
Start: 2023-10-24 | End: 2023-10-24

## 2023-10-23 RX ORDER — TRAMADOL HYDROCHLORIDE 50 MG/1
50 TABLET ORAL EVERY 6 HOURS
Refills: 0 | Status: DISCONTINUED | OUTPATIENT
Start: 2023-10-23 | End: 2023-10-23

## 2023-10-23 RX ORDER — CEFAZOLIN SODIUM 1 G
2000 VIAL (EA) INJECTION EVERY 8 HOURS
Refills: 0 | Status: COMPLETED | OUTPATIENT
Start: 2023-10-23 | End: 2023-10-24

## 2023-10-23 RX ORDER — INSULIN LISPRO 100/ML
VIAL (ML) SUBCUTANEOUS AT BEDTIME
Refills: 0 | Status: DISCONTINUED | OUTPATIENT
Start: 2023-10-23 | End: 2023-11-06

## 2023-10-23 RX ADMIN — Medication 2: at 17:27

## 2023-10-23 RX ADMIN — POLYETHYLENE GLYCOL 3350 17 GRAM(S): 17 POWDER, FOR SOLUTION ORAL at 21:47

## 2023-10-23 RX ADMIN — HYDROMORPHONE HYDROCHLORIDE 0.5 MILLIGRAM(S): 2 INJECTION INTRAMUSCULAR; INTRAVENOUS; SUBCUTANEOUS at 13:48

## 2023-10-23 RX ADMIN — ATORVASTATIN CALCIUM 10 MILLIGRAM(S): 80 TABLET, FILM COATED ORAL at 21:47

## 2023-10-23 RX ADMIN — SODIUM CHLORIDE 60 MILLILITER(S): 9 INJECTION, SOLUTION INTRAVENOUS at 08:50

## 2023-10-23 RX ADMIN — HYDROMORPHONE HYDROCHLORIDE 0.5 MILLIGRAM(S): 2 INJECTION INTRAMUSCULAR; INTRAVENOUS; SUBCUTANEOUS at 12:37

## 2023-10-23 RX ADMIN — SENNA PLUS 2 TABLET(S): 8.6 TABLET ORAL at 21:46

## 2023-10-23 RX ADMIN — FAMOTIDINE 20 MILLIGRAM(S): 10 INJECTION INTRAVENOUS at 17:27

## 2023-10-23 RX ADMIN — Medication 100 MILLIGRAM(S): at 17:26

## 2023-10-23 RX ADMIN — Medication 1000 MILLIGRAM(S): at 18:27

## 2023-10-23 RX ADMIN — Medication 400 MILLIGRAM(S): at 17:27

## 2023-10-23 RX ADMIN — ONDANSETRON 4 MILLIGRAM(S): 8 TABLET, FILM COATED ORAL at 17:39

## 2023-10-23 NOTE — PHYSICAL THERAPY INITIAL EVALUATION ADULT - RANGE OF MOTION EXAMINATION, REHAB EVAL
R LE: 0-90/bilateral upper extremity ROM was WNL (within normal limits)/Left LE ROM was WNL (within normal limits)

## 2023-10-23 NOTE — PROGRESS NOTE ADULT - SUBJECTIVE AND OBJECTIVE BOX
Orthopaedic PA    Procedure: s/p R  TKR  Surgeon: Lucas    78y Female comfortable, without complaints.     Denies chest pain, shortness of breath, palpitations, nausea, vomiting, dizziness, fevers, chills    PE:  Vital Signs Last 24 Hrs  T(C): 36.3 (23 Oct 2023 14:01), Max: 37.2 (23 Oct 2023 12:12)  T(F): 97.3 (23 Oct 2023 14:01), Max: 99 (23 Oct 2023 12:12)  HR: 105 (23 Oct 2023 14:01) (82 - 115)  BP: 135/74 (23 Oct 2023 14:01) (128/65 - 170/111)  BP(mean): --  RR: 18 (23 Oct 2023 14:01) (11 - 18)  SpO2: 93% (23 Oct 2023 14:01) (93% - 99%)    Parameters below as of 23 Oct 2023 14:01  Patient On (Oxygen Delivery Method): nasal cannula  O2 Flow (L/min): 2    General:  A + O x 3 in NAD    Knee: Aquacel  Dressing: C/D/I     Lower Extremity Exam:         5/5 Tibialis Anterior ( dorsiflexion )      5/5 Gastrocsoleus ( plantarflexion )      5/5 Extensor Hallucis Longus ( toe extension )      5/5  Flexor Hallucis Longus ( toe flexion)            Sensation intact to Light touch L2-S1    +2 DP/PT Pulses  Compartments soft and compressible  No calf tenderness bilaterally                          13.0   18.01 )-----------( 268      ( 23 Oct 2023 13:15 )             40.9       10-23    143  |  110<H>  |  16  ----------------------------<  239<H>  3.6   |  23  |  0.59    Ca    8.7      23 Oct 2023 13:15          A/P: 78y Female stable POD#0 s/p R  TKR     - Pain control   - Incentive spirometer  - DVT ppx: SCD / Chemical  mg twice a day starting tomorrow   - Ambulation as tolerated  - PT, OT WBAT right leg   - F/U AM Labs  - Discharge planning home with home care tomorrow

## 2023-10-23 NOTE — PHYSICAL THERAPY INITIAL EVALUATION ADULT - STANDING BALANCE: DYNAMIC, REHAB EVAL
14 DAY STM VISIT    Diagnostic AHI:  18.1    Subjective measures:   Patient stated things are going great and she has no questions or concerns. Patient sleeping better each night and feeling better more rested. Patient has been waking up with a bloody nose each more.    Assessment: Pt meeting objective benchmarks.  Patient meeting subjective benchmarks. Instructed patient to use Ayr saline gel and to increase her humidity.   Action plan: Pt to have 30 day STM visit.    Device type: Auto-CPAP  PAP settings: CPAP min 5.0 cm  H20     CPAP max 15.0 cm  H20    CPAP fixed 0 cm  H20    95th% pressure 12 cm   Mask type:  Nasal Mask  Objective measures: 14 day rolling measures      Compliance  100 %      Leak  15.36 lpm  last  upload      AHI 3.9   last  upload      Average number of minutes 488     Average hours of usage 8.1          Objective measure goal  Compliance   Goal >70%  Leak   Goal < 24 lpm  AHI  Goal < 5  Usage  Goal >240  
fair plus

## 2023-10-23 NOTE — PRE-OP CHECKLIST - BLOOD AVAILABLE
Impression: Type 2 diab w mild nonprlf diabetic rtnop w/o macular edema, bilateral: R56.8239. Plan: Discussed findings, mild retinopathy OU to extent seen undilated. Hx of stroke 08/2022; patient was previously not on tx for DM/HTN. OCT macula ordered today, shows no DME OU. Patient educated on importance of well-controlled blood sugar/pressure, risk of vision loss from diabetic retinopathy, and dilated eye exams. Continue management with PCP. RTC for [facility] after LPI consult. no

## 2023-10-23 NOTE — CONSULT NOTE ADULT - SUBJECTIVE AND OBJECTIVE BOX
Patient is a 78y old  Female who presents with a chief complaint of R TKA (23 Oct 2023 14:33)        INTERVAL HPI/OVERNIGHT EVENTS:  T(C): 36.4 (10-23-23 @ 20:30), Max: 37.2 (10-23-23 @ 12:12)  HR: 83 (10-23-23 @ 20:30) (82 - 115)  BP: 114/70 (10-23-23 @ 20:30) (114/70 - 170/111)  RR: 18 (10-23-23 @ 20:30) (11 - 18)  SpO2: 95% (10-23-23 @ 20:30) (92% - 99%)  Wt(kg): --  I&O's Summary      PAST MEDICAL & SURGICAL HISTORY:  Hypertension      Type 2 diabetes mellitus      Unilateral primary osteoarthritis, right knee      History of  section      S/P cataract surgery          SOCIAL HISTORY  Alcohol: neg  Tobacco: neg  Illicit substance use: neg      FAMILY HISTORY: NC    Home Medications:  amlodipine-olmesartan 5 mg-40 mg oral tablet: 1 tab(s) orally once a day (in the morning) (23 Oct 2023 08:13)  atorvastatin 10 mg oral tablet: 1 tab(s) orally every other day AM (23 Oct 2023 08:13)  Caltrate 600 mg oral tablet: 2 tab(s) orally once a day (in the morning) (23 Oct 2023 08:13)  CeleBREX 100 mg oral capsule: 2 cap(s) orally once (23 Oct 2023 08:11)  Farxiga 10 mg oral tablet: 1 tab(s) orally once a day (in the morning) (23 Oct 2023 08:13)  ibandronate 150 mg oral tablet: 1 tab(s) orally once a month (23 Oct 2023 08:13)  metFORMIN 500 mg oral tablet, extended release: 1 tab(s) orally once a day (in the evening) (23 Oct 2023 08:13)  Multiple Vitamins oral tablet: 1 tab(s) orally once a day (in the morning) (23 Oct 2023 08:13)  Restasis 0.05% ophthalmic emulsion: 1 drop(s) in each affected eye 2 times a day (23 Oct 2023 08:13)  Rybelsus 7 mg oral tablet: 1 tab(s) orally once a day (at bedtime) (23 Oct 2023 08:13)        LABS:                        13.0   18.01 )-----------( 268      ( 23 Oct 2023 13:15 )             40.9     10-23    143  |  110<H>  |  16  ----------------------------<  239<H>  3.6   |  23  |  0.59    Ca    8.7      23 Oct 2023 13:15        Urinalysis Basic - ( 23 Oct 2023 13:15 )    Color: x / Appearance: x / SG: x / pH: x  Gluc: 239 mg/dL / Ketone: x  / Bili: x / Urobili: x   Blood: x / Protein: x / Nitrite: x   Leuk Esterase: x / RBC: x / WBC x   Sq Epi: x / Non Sq Epi: x / Bacteria: x      CAPILLARY BLOOD GLUCOSE      POCT Blood Glucose.: 172 mg/dL (23 Oct 2023 21:26)  POCT Blood Glucose.: 222 mg/dL (23 Oct 2023 16:55)  POCT Blood Glucose.: 163 mg/dL (23 Oct 2023 12:16)  POCT Blood Glucose.: 121 mg/dL (23 Oct 2023 08:26)        Urinalysis Basic - ( 23 Oct 2023 13:15 )    Color: x / Appearance: x / SG: x / pH: x  Gluc: 239 mg/dL / Ketone: x  / Bili: x / Urobili: x   Blood: x / Protein: x / Nitrite: x   Leuk Esterase: x / RBC: x / WBC x   Sq Epi: x / Non Sq Epi: x / Bacteria: x        MEDICATIONS  (STANDING):  atorvastatin 10 milliGRAM(s) Oral at bedtime  ceFAZolin   IVPB 2000 milliGRAM(s) IV Intermittent every 8 hours  dextrose 5%. 1000 milliLiter(s) (100 mL/Hr) IV Continuous <Continuous>  dextrose 5%. 1000 milliLiter(s) (50 mL/Hr) IV Continuous <Continuous>  dextrose 50% Injectable 12.5 Gram(s) IV Push once  dextrose 50% Injectable 25 Gram(s) IV Push once  dextrose 50% Injectable 25 Gram(s) IV Push Once  dextrose 50% Injectable 25 Gram(s) IV Push once  dextrose 50% Injectable 12.5 Gram(s) IV Push Once  dextrose 50% Injectable 25 Gram(s) IV Push Once  dextrose Oral Gel 15 Gram(s) Oral Once  famotidine    Tablet 20 milliGRAM(s) Oral every 12 hours  glucagon  Injectable 1 milliGRAM(s) IntraMuscular Once  glucagon  Injectable 1 milliGRAM(s) IntraMuscular once  insulin lispro (ADMELOG) corrective regimen sliding scale   SubCutaneous at bedtime  insulin lispro (ADMELOG) corrective regimen sliding scale   SubCutaneous three times a day before meals  polyethylene glycol 3350 17 Gram(s) Oral at bedtime  senna 2 Tablet(s) Oral at bedtime  sodium chloride 0.9%. 1000 milliLiter(s) (75 mL/Hr) IV Continuous <Continuous>    MEDICATIONS  (PRN):  dextrose Oral Gel 15 Gram(s) Oral once PRN Blood Glucose LESS THAN 70 milliGRAM(s)/deciliter  HYDROmorphone  Injectable 0.25 milliGRAM(s) IV Push every 6 hours PRN Severe Pain (7 - 10)  magnesium hydroxide Suspension 30 milliLiter(s) Oral daily PRN Constipation  ondansetron Injectable 4 milliGRAM(s) IV Push every 6 hours PRN Nausea and/or Vomiting  traMADol 50 milliGRAM(s) Oral every 6 hours PRN Moderate Pain (4 - 6)  traMADol 25 milliGRAM(s) Oral every 6 hours PRN Mild Pain (1 - 3)      REVIEW OF SYSTEMS:  CONSTITUTIONAL: No fever, weight loss, or fatigue  EYES: No eye pain, visual disturbances, or discharge  ENMT:  No difficulty hearing, tinnitus, vertigo; No sinus or throat pain  NECK: No pain or stiffness  RESPIRATORY: No cough, wheezing, chills or hemoptysis; No shortness of breath  CARDIOVASCULAR: No chest pain, palpitations, dizziness, or leg swelling  GASTROINTESTINAL: No abdominal or epigastric pain. No nausea, vomiting, or hematemesis; No diarrhea or constipation. No melena or hematochezia.  GENITOURINARY: No dysuria, frequency, hematuria, or incontinence  NEUROLOGICAL: No headaches, memory loss, loss of strength, numbness, or tremors  SKIN: No itching, burning, rashes, or lesions   LYMPH NODES: No enlarged glands  ENDOCRINE: No heat or cold intolerance; No hair loss  MUSCULOSKELETAL: chronic r knee pain  PSYCHIATRIC: No depression, anxiety, mood swings, or difficulty sleeping  HEME/LYMPH: No easy bruising, or bleeding gums  ALLERY AND IMMUNOLOGIC: No hives or eczema    RADIOLOGY & ADDITIONAL TESTS:    Imaging Personally Reviewed:  [ x] YES  [ ] NO    Consultant(s) Notes Reviewed:  [x YES  [ ] NO        PHYSICAL EXAM:  GENERAL: NAD, well-groomed, well-developed  HEAD:  Atraumatic, Normocephalic  EYES: EOMI, PERRLA, conjunctiva and sclera clear  ENMT: No tonsillar erythema, exudates, or enlargement; Moist mucous membranes, Good dentition, No lesions  NECK: Supple, No JVD, Normal thyroid  NERVOUS SYSTEM:  Alert & Oriented X3, Good concentration; Motor Strength 5/5 B/L upper and lower extremities; DTRs 2+ intact and symmetric  CHEST/LUNG: Clear to percussion bilaterally; No rales, rhonchi, wheezing, or rubs  HEART: Regular rate and rhythm; No murmurs, rubs, or gallops  ABDOMEN: Soft, Nontender, Nondistended; Bowel sounds present  EXTREMITIES:  2+ Peripheral Pulses, No clubbing, cyanosis, or edema  LYMPH: No lymphadenopathy noted  SKIN: No rashes or lesions    Care Discussed with Consultants/Other Providers [x ] YES  [ ] NO    advance care planning and advance directives discussed, including but not limited to long term care planning, and all forms reviewed [x]YES

## 2023-10-23 NOTE — PATIENT PROFILE ADULT - FALL HARM RISK - UNIVERSAL INTERVENTIONS
Bed in lowest position, wheels locked, appropriate side rails in place/Call bell, personal items and telephone in reach/Instruct patient to call for assistance before getting out of bed or chair/Non-slip footwear when patient is out of bed/[place] to call system/Physically safe environment - no spills, clutter or unnecessary equipment/Purposeful Proactive Rounding/Room/bathroom lighting operational, light cord in reach

## 2023-10-24 ENCOUNTER — TRANSCRIPTION ENCOUNTER (OUTPATIENT)
Age: 78
End: 2023-10-24

## 2023-10-24 VITALS
DIASTOLIC BLOOD PRESSURE: 66 MMHG | RESPIRATION RATE: 18 BRPM | SYSTOLIC BLOOD PRESSURE: 116 MMHG | HEART RATE: 85 BPM | TEMPERATURE: 99 F | OXYGEN SATURATION: 91 %

## 2023-10-24 LAB
ANION GAP SERPL CALC-SCNC: 6 MMOL/L — SIGNIFICANT CHANGE UP (ref 5–17)
ANION GAP SERPL CALC-SCNC: 6 MMOL/L — SIGNIFICANT CHANGE UP (ref 5–17)
BUN SERPL-MCNC: 13 MG/DL — SIGNIFICANT CHANGE UP (ref 7–23)
BUN SERPL-MCNC: 13 MG/DL — SIGNIFICANT CHANGE UP (ref 7–23)
CALCIUM SERPL-MCNC: 8.3 MG/DL — LOW (ref 8.5–10.1)
CALCIUM SERPL-MCNC: 8.3 MG/DL — LOW (ref 8.5–10.1)
CHLORIDE SERPL-SCNC: 109 MMOL/L — HIGH (ref 96–108)
CHLORIDE SERPL-SCNC: 109 MMOL/L — HIGH (ref 96–108)
CO2 SERPL-SCNC: 28 MMOL/L — SIGNIFICANT CHANGE UP (ref 22–31)
CO2 SERPL-SCNC: 28 MMOL/L — SIGNIFICANT CHANGE UP (ref 22–31)
CREAT SERPL-MCNC: 0.5 MG/DL — SIGNIFICANT CHANGE UP (ref 0.5–1.3)
CREAT SERPL-MCNC: 0.5 MG/DL — SIGNIFICANT CHANGE UP (ref 0.5–1.3)
EGFR: 97 ML/MIN/1.73M2 — SIGNIFICANT CHANGE UP
EGFR: 97 ML/MIN/1.73M2 — SIGNIFICANT CHANGE UP
GLUCOSE BLDC GLUCOMTR-MCNC: 106 MG/DL — HIGH (ref 70–99)
GLUCOSE BLDC GLUCOMTR-MCNC: 106 MG/DL — HIGH (ref 70–99)
GLUCOSE BLDC GLUCOMTR-MCNC: 168 MG/DL — HIGH (ref 70–99)
GLUCOSE BLDC GLUCOMTR-MCNC: 168 MG/DL — HIGH (ref 70–99)
GLUCOSE SERPL-MCNC: 129 MG/DL — HIGH (ref 70–99)
GLUCOSE SERPL-MCNC: 129 MG/DL — HIGH (ref 70–99)
HCT VFR BLD CALC: 32.2 % — LOW (ref 34.5–45)
HCT VFR BLD CALC: 32.2 % — LOW (ref 34.5–45)
HGB BLD-MCNC: 10.7 G/DL — LOW (ref 11.5–15.5)
HGB BLD-MCNC: 10.7 G/DL — LOW (ref 11.5–15.5)
MCHC RBC-ENTMCNC: 29.3 PG — SIGNIFICANT CHANGE UP (ref 27–34)
MCHC RBC-ENTMCNC: 29.3 PG — SIGNIFICANT CHANGE UP (ref 27–34)
MCHC RBC-ENTMCNC: 33.2 GM/DL — SIGNIFICANT CHANGE UP (ref 32–36)
MCHC RBC-ENTMCNC: 33.2 GM/DL — SIGNIFICANT CHANGE UP (ref 32–36)
MCV RBC AUTO: 88.2 FL — SIGNIFICANT CHANGE UP (ref 80–100)
MCV RBC AUTO: 88.2 FL — SIGNIFICANT CHANGE UP (ref 80–100)
NRBC # BLD: 0 /100 WBCS — SIGNIFICANT CHANGE UP (ref 0–0)
NRBC # BLD: 0 /100 WBCS — SIGNIFICANT CHANGE UP (ref 0–0)
PLATELET # BLD AUTO: 226 K/UL — SIGNIFICANT CHANGE UP (ref 150–400)
PLATELET # BLD AUTO: 226 K/UL — SIGNIFICANT CHANGE UP (ref 150–400)
POTASSIUM SERPL-MCNC: 3.9 MMOL/L — SIGNIFICANT CHANGE UP (ref 3.5–5.3)
POTASSIUM SERPL-MCNC: 3.9 MMOL/L — SIGNIFICANT CHANGE UP (ref 3.5–5.3)
POTASSIUM SERPL-SCNC: 3.9 MMOL/L — SIGNIFICANT CHANGE UP (ref 3.5–5.3)
POTASSIUM SERPL-SCNC: 3.9 MMOL/L — SIGNIFICANT CHANGE UP (ref 3.5–5.3)
RBC # BLD: 3.65 M/UL — LOW (ref 3.8–5.2)
RBC # BLD: 3.65 M/UL — LOW (ref 3.8–5.2)
RBC # FLD: 15.2 % — HIGH (ref 10.3–14.5)
RBC # FLD: 15.2 % — HIGH (ref 10.3–14.5)
SODIUM SERPL-SCNC: 143 MMOL/L — SIGNIFICANT CHANGE UP (ref 135–145)
SODIUM SERPL-SCNC: 143 MMOL/L — SIGNIFICANT CHANGE UP (ref 135–145)
WBC # BLD: 19.17 K/UL — HIGH (ref 3.8–10.5)
WBC # BLD: 19.17 K/UL — HIGH (ref 3.8–10.5)
WBC # FLD AUTO: 19.17 K/UL — HIGH (ref 3.8–10.5)
WBC # FLD AUTO: 19.17 K/UL — HIGH (ref 3.8–10.5)

## 2023-10-24 RX ORDER — TRAMADOL HYDROCHLORIDE 50 MG/1
1 TABLET ORAL
Qty: 28 | Refills: 0
Start: 2023-10-24 | End: 2023-10-30

## 2023-10-24 RX ORDER — SENNA PLUS 8.6 MG/1
2 TABLET ORAL
Qty: 14 | Refills: 0
Start: 2023-10-24 | End: 2023-10-30

## 2023-10-24 RX ORDER — ASPIRIN/CALCIUM CARB/MAGNESIUM 324 MG
1 TABLET ORAL
Qty: 60 | Refills: 0
Start: 2023-10-24 | End: 2023-11-22

## 2023-10-24 RX ADMIN — Medication 1000 MILLIGRAM(S): at 03:21

## 2023-10-24 RX ADMIN — LOSARTAN POTASSIUM 100 MILLIGRAM(S): 100 TABLET, FILM COATED ORAL at 06:10

## 2023-10-24 RX ADMIN — CELECOXIB 200 MILLIGRAM(S): 200 CAPSULE ORAL at 06:10

## 2023-10-24 RX ADMIN — AMLODIPINE BESYLATE 5 MILLIGRAM(S): 2.5 TABLET ORAL at 06:10

## 2023-10-24 RX ADMIN — CELECOXIB 200 MILLIGRAM(S): 200 CAPSULE ORAL at 06:59

## 2023-10-24 RX ADMIN — Medication 1: at 12:34

## 2023-10-24 RX ADMIN — Medication 100 MILLIGRAM(S): at 02:09

## 2023-10-24 RX ADMIN — Medication 400 MILLIGRAM(S): at 02:48

## 2023-10-24 RX ADMIN — Medication 650 MILLIGRAM(S): at 10:57

## 2023-10-24 RX ADMIN — Medication 325 MILLIGRAM(S): at 06:10

## 2023-10-24 RX ADMIN — FAMOTIDINE 20 MILLIGRAM(S): 10 INJECTION INTRAVENOUS at 06:11

## 2023-10-24 RX ADMIN — Medication 650 MILLIGRAM(S): at 09:57

## 2023-10-24 RX ADMIN — Medication 650 MILLIGRAM(S): at 14:36

## 2023-10-24 NOTE — ASU DISCHARGE PLAN (ADULT/PEDIATRIC) - PLEASE INDICATE TEMPERATURE IN FAHRENHEIT OR CELSIUS
Discharge Planner SLP   Patient plan for discharge: Did not discuss  Current status: Patient seen for swallowing tx. RN present reporting that pt had a rough night and only ate breakfast yesterday. Pt agreed to a snack. No overt s/sx of aspiration with thin liquids via straw. Tolerated teaspoons of puree consistencies demonstrating delay in swallow initiation. Pt consumed 1 bite of cracker demonstrating laborious mastication due to weakness and current medical complexity. Pt is tolerating current diet with limited interest in PO at this time. Discussed results and education with pt's parents who demonstrated understanding and are in agreement.     1) Recommend regular diet with thin liquids, softer food choices due to fatigue and generalized weakness.   2) Strict swallow precautions: do not offer food if too fatigued/impulsive, moisten/softer food choices if fatigued, GERD precautions, upright for all oral intake, slow rate, small bites/sips, and alternate consistencies.   3) SLP to complete the orders as RN and parents demonstrate understanding of recommendations for continued safe swallowing.   4) Please re-consult with further concerns, increased s/sx of aspiration, or worsening respiratory status related to PO.    Barriers to return to prior living situation: None from SLP  Recommendations for discharge: Defer to medical team  Rationale for recommendations: No further ST for dysphagia       Entered by: Brooklyn Mancia 02/16/2020 11:33 AM        89 F

## 2023-10-24 NOTE — DIETITIAN INITIAL EVALUATION ADULT - PERTINENT MEDS FT
MEDICATIONS  (STANDING):  acetaminophen     Tablet .. 650 milliGRAM(s) Oral every 6 hours  amLODIPine   Tablet 5 milliGRAM(s) Oral daily  aspirin 325 milliGRAM(s) Oral two times a day  atorvastatin 10 milliGRAM(s) Oral at bedtime  celecoxib 200 milliGRAM(s) Oral every 12 hours  dextrose 5%. 1000 milliLiter(s) (100 mL/Hr) IV Continuous <Continuous>  dextrose 5%. 1000 milliLiter(s) (50 mL/Hr) IV Continuous <Continuous>  dextrose 50% Injectable 12.5 Gram(s) IV Push once  dextrose 50% Injectable 25 Gram(s) IV Push once  dextrose 50% Injectable 25 Gram(s) IV Push Once  dextrose 50% Injectable 25 Gram(s) IV Push once  dextrose 50% Injectable 12.5 Gram(s) IV Push Once  dextrose 50% Injectable 25 Gram(s) IV Push Once  dextrose Oral Gel 15 Gram(s) Oral Once  famotidine    Tablet 20 milliGRAM(s) Oral every 12 hours  glucagon  Injectable 1 milliGRAM(s) IntraMuscular Once  glucagon  Injectable 1 milliGRAM(s) IntraMuscular once  insulin lispro (ADMELOG) corrective regimen sliding scale   SubCutaneous at bedtime  insulin lispro (ADMELOG) corrective regimen sliding scale   SubCutaneous three times a day before meals  losartan 100 milliGRAM(s) Oral daily  polyethylene glycol 3350 17 Gram(s) Oral at bedtime  senna 2 Tablet(s) Oral at bedtime  sodium chloride 0.9%. 1000 milliLiter(s) (75 mL/Hr) IV Continuous <Continuous>    MEDICATIONS  (PRN):  dextrose Oral Gel 15 Gram(s) Oral once PRN Blood Glucose LESS THAN 70 milliGRAM(s)/deciliter  HYDROmorphone  Injectable 0.25 milliGRAM(s) IV Push every 6 hours PRN Severe Pain (7 - 10)  magnesium hydroxide Suspension 30 milliLiter(s) Oral daily PRN Constipation  ondansetron Injectable 4 milliGRAM(s) IV Push every 6 hours PRN Nausea and/or Vomiting  traMADol 25 milliGRAM(s) Oral every 6 hours PRN Mild Pain (1 - 3)  traMADol 50 milliGRAM(s) Oral every 6 hours PRN Moderate Pain (4 - 6)

## 2023-10-24 NOTE — OCCUPATIONAL THERAPY INITIAL EVALUATION ADULT - ADL RETRAINING, OT EVAL
Pt will dress lower body with Stevan, seated, AE as needed within 2-5 sessions. Pt will complete grooming tasks standing at sink with supervision for safety (+) RW within 2-5 sessions.

## 2023-10-24 NOTE — DIETITIAN INITIAL EVALUATION ADULT - ENTER TO (CAL/KG)
30 Topical Retinoid Pregnancy And Lactation Text: This medication is Pregnancy Category C. It is unknown if this medication is excreted in breast milk.

## 2023-10-24 NOTE — PHARMACOTHERAPY INTERVENTION NOTE - COMMENTS
Orthopedics Counseling    Discussed current and likely discharge medication list with patient including indication, directions, and adverse effects to monitor.    Counseling materials provided/counseling aids used: 1 pager  Time spent Counseling: 15 minutes

## 2023-10-24 NOTE — CAREGIVER ENGAGEMENT NOTE - CAREGIVER OUTREACH NOTES - FREE TEXT
SW spoke with pt son Miguel regarding plan for d/c. Pt to d/c to  [facility] today between 3/4pm via [facility] amb[facility]lette. Pt advised at bedside and is accepting bed offer. SW to follow and remain available for any needs. SW spoke with pt son Miguel regarding plan for d/c. Pt to d/c to  [facility] today between 3/4pm via [facility] amb[facility]lette. Pt advised at bedside and is accepting bed offer. SW to follow and remain available for any needs.

## 2023-10-24 NOTE — DISCHARGE NOTE NURSING/CASE MANAGEMENT/SOCIAL WORK - PATIENT PORTAL LINK FT
You can access the FollowMyHealth Patient Portal offered by [facility] by registering at the following website: [email or web address]. By joining [facility]’s FollowMyHealth portal, you will also be able to view your health information using other applications (apps) compatible with our system.

## 2023-10-24 NOTE — OCCUPATIONAL THERAPY INITIAL EVALUATION ADULT - ADDITIONAL COMMENTS
Pt lives alone in a private home with 5 steps to enter. Bathroom has a tub with grab bars. PTA pt was independent with ADLs/IADLs and functional mobility without AD. (+) .

## 2023-10-24 NOTE — ASU DISCHARGE PLAN (ADULT/PEDIATRIC) - NS MD DC FALL RISK RISK
For information on Fall & Injury Prevention, visit: [email or web address] OR  [email or web address] OR  https://www.cdc.gov/steadi/patient.html

## 2023-10-24 NOTE — ASU DISCHARGE PLAN (ADULT/PEDIATRIC) - CARE PROVIDER_API CALL
Moses Zavala  Orthopaedic Surgery  [address] [place] [id number]  Phone: [phone]  Fax: [phone]  Follow Up Time:

## 2023-10-24 NOTE — CARE COORDINATION ASSESSMENT. - NSPASTMEDSURGHISTORY_GEN_ALL_CORE_FT
PAST MEDICAL & SURGICAL HISTORY:  Hypertension      History of  section      Unilateral primary osteoarthritis, right knee      Type 2 diabetes mellitus      S/P cataract surgery

## 2023-10-24 NOTE — PATIENT CHOICE NOTE. - NSPTCHOICESTATE_GEN_ALL_CORE

## 2023-10-24 NOTE — ASU DISCHARGE PLAN (ADULT/PEDIATRIC) - ASU DC SPECIAL INSTRUCTIONSFT
Weight Bearing As Tolerated  Aspirin 325 mg twice a day for 30 days   Follow Up With Dr. Zavala In 2 Weeks   Call [phone] For An Appointment.  Keep Knee Aquacel  Dressing  On Dry And Clean, It Will Be Changed Or Removed On Your Next Office Visit.

## 2023-10-24 NOTE — DIETITIAN INITIAL EVALUATION ADULT - ORAL INTAKE PTA/DIET HISTORY
patient reports ate breakfast well ate 1/2 bagel and eggs , has lost weight not eating out and decreased portions. reports food preferences. appears to take Orange juice at home. RD recommended avoiding juice for best blood sugar control. requesting tomato juice here in hospital. patient on metformin and farxiga at home. eats 3 meals per day. patient reports drinks soda with dinner RD discouraged soda usage and suggested alternatives.   no food allergies patient refuses written education at this time A1c 6.1%  no difficulties chewing swallowing

## 2023-10-24 NOTE — DIETITIAN INITIAL EVALUATION ADULT - OTHER INFO
78 year old female Right TKA, PMH HTN DM type 2,   patient reports with weight loss 2/2 diet change was 163# Now 133#

## 2023-10-24 NOTE — CARE COORDINATION ASSESSMENT. - NSCAREPROVIDERS_GEN_ALL_CORE_FT
CARE PROVIDERS:  Administration: Afshin Mosley  Administration: Kj Rojas  Administration: Italia Matamoros  Admitting: Moses Zavala  Attending: Moses Zavala  Consultant: Perlman, Daryl  Consultant: Weil, Patricia  Consultant: Saravanan Gonzales  Consultant: Isa Lynne  Nurse: Susy Smiley  Nurse: Viola Hobbs  Occupational Therapy: Cindy Rivas  Occupational Therapy: Karine Marrufo  Ordered: ADM, User  Override: Shiloh Palmer  PCA/Nursing Assistant: Ivan Vela  Physical Therapy: Shawn Brown  Primary Team: Lobo Guzman  Primary Team: Marleny De La Rosa  Registered Dietitian: Tabitha Frausto  : Alix Posey// Supp. Assoc.: Constanza Torres

## 2023-10-24 NOTE — PROGRESS NOTE ADULT - SUBJECTIVE AND OBJECTIVE BOX
Orthopaedic PA    Procedure: s/p R TKR  Surgeon: Lucas    78y Female comfortable, without complaints.     Denies chest pain, shortness of breath, palpitations, nausea, vomiting, dizziness, fevers, chills    PE:  Vital Signs Last 24 Hrs  T(C): 37.1 (24 Oct 2023 04:57), Max: 37.2 (23 Oct 2023 12:12)  T(F): 98.7 (24 Oct 2023 04:57), Max: 99 (23 Oct 2023 12:12)  HR: 83 (24 Oct 2023 04:57) (82 - 115)  BP: 144/72 (24 Oct 2023 04:57) (114/70 - 170/111)  BP(mean): --  RR: 18 (24 Oct 2023 04:57) (11 - 18)  SpO2: 96% (24 Oct 2023 04:57) (92% - 99%)    Parameters below as of 24 Oct 2023 04:57  Patient On (Oxygen Delivery Method): nasal cannula      General:  A + O x 3 in NAD    Knee: Aquacel  Dressing: C/D/I     Lower Extremity Exam:         5/5 Tibialis Anterior ( dorsiflexion )      5/5 Gastrocsoleus ( plantarflexion )      5/5 Extensor Hallucis Longus ( toe extension )      5/5  Flexor Hallucis Longus ( toe flexion)            Sensation intact to Light touch L2-S1    +2 DP/PT Pulses  Compartments soft and compressible  No calf tenderness bilaterally                          10.7   19.17 )-----------( 226      ( 24 Oct 2023 05:55 )             32.2       10-24    143  |  109<H>  |  13  ----------------------------<  129<H>  3.9   |  28  |  0.50    Ca    8.3<L>      24 Oct 2023 05:55          A/P: 78y Female stable POD#1 s/p R TKR     - Pain control   - Incentive spirometer  - DVT ppx: SCD / Chemical  mg twice a day   - Ambulation as tolerated  - PT, OT  - F/U AM Labs  - Discharge planning rehab

## 2023-10-24 NOTE — DIETITIAN INITIAL EVALUATION ADULT - PERTINENT LABORATORY DATA
10-24    143  |  109<H>  |  13  ----------------------------<  129<H>  3.9   |  28  |  0.50    Ca    8.3<L>      24 Oct 2023 05:55    POCT Blood Glucose.: 106 mg/dL (10-24-23 @ 07:55)  A1C with Estimated Average Glucose Result: 6.1 % (10-13-23 @ 15:40)

## 2023-10-25 LAB
ANION GAP SERPL CALC-SCNC: 9 MMOL/L — SIGNIFICANT CHANGE UP (ref 5–17)
ANION GAP SERPL CALC-SCNC: 9 MMOL/L — SIGNIFICANT CHANGE UP (ref 5–17)
BUN SERPL-MCNC: 13 MG/DL — SIGNIFICANT CHANGE UP (ref 7–23)
BUN SERPL-MCNC: 13 MG/DL — SIGNIFICANT CHANGE UP (ref 7–23)
CALCIUM SERPL-MCNC: 8.5 MG/DL — SIGNIFICANT CHANGE UP (ref 8.5–10.1)
CALCIUM SERPL-MCNC: 8.5 MG/DL — SIGNIFICANT CHANGE UP (ref 8.5–10.1)
CHLORIDE SERPL-SCNC: 108 MMOL/L — SIGNIFICANT CHANGE UP (ref 96–108)
CHLORIDE SERPL-SCNC: 108 MMOL/L — SIGNIFICANT CHANGE UP (ref 96–108)
CO2 SERPL-SCNC: 25 MMOL/L — SIGNIFICANT CHANGE UP (ref 22–31)
CO2 SERPL-SCNC: 25 MMOL/L — SIGNIFICANT CHANGE UP (ref 22–31)
CREAT SERPL-MCNC: 1 MG/DL — SIGNIFICANT CHANGE UP (ref 0.5–1.3)
CREAT SERPL-MCNC: 1 MG/DL — SIGNIFICANT CHANGE UP (ref 0.5–1.3)
EGFR: 58 ML/MIN/1.73M2 — LOW
EGFR: 58 ML/MIN/1.73M2 — LOW
GLUCOSE SERPL-MCNC: 150 MG/DL — HIGH (ref 70–99)
GLUCOSE SERPL-MCNC: 150 MG/DL — HIGH (ref 70–99)
HCT VFR BLD CALC: 39.3 % — SIGNIFICANT CHANGE UP (ref 34.5–45)
HCT VFR BLD CALC: 39.3 % — SIGNIFICANT CHANGE UP (ref 34.5–45)
HGB BLD-MCNC: 12.4 G/DL — SIGNIFICANT CHANGE UP (ref 11.5–15.5)
HGB BLD-MCNC: 12.4 G/DL — SIGNIFICANT CHANGE UP (ref 11.5–15.5)
MCHC RBC-ENTMCNC: 25.2 PG — LOW (ref 27–34)
MCHC RBC-ENTMCNC: 25.2 PG — LOW (ref 27–34)
MCHC RBC-ENTMCNC: 31.6 GM/DL — LOW (ref 32–36)
MCHC RBC-ENTMCNC: 31.6 GM/DL — LOW (ref 32–36)
MCV RBC AUTO: 79.9 FL — LOW (ref 80–100)
MCV RBC AUTO: 79.9 FL — LOW (ref 80–100)
NRBC # BLD: 0 /100 WBCS — SIGNIFICANT CHANGE UP (ref 0–0)
NRBC # BLD: 0 /100 WBCS — SIGNIFICANT CHANGE UP (ref 0–0)
PLATELET # BLD AUTO: 164 K/UL — SIGNIFICANT CHANGE UP (ref 150–400)
PLATELET # BLD AUTO: 164 K/UL — SIGNIFICANT CHANGE UP (ref 150–400)
POTASSIUM SERPL-MCNC: 3.9 MMOL/L — SIGNIFICANT CHANGE UP (ref 3.5–5.3)
POTASSIUM SERPL-MCNC: 3.9 MMOL/L — SIGNIFICANT CHANGE UP (ref 3.5–5.3)
POTASSIUM SERPL-SCNC: 3.9 MMOL/L — SIGNIFICANT CHANGE UP (ref 3.5–5.3)
POTASSIUM SERPL-SCNC: 3.9 MMOL/L — SIGNIFICANT CHANGE UP (ref 3.5–5.3)
RBC # BLD: 4.92 M/UL — SIGNIFICANT CHANGE UP (ref 3.8–5.2)
RBC # BLD: 4.92 M/UL — SIGNIFICANT CHANGE UP (ref 3.8–5.2)
RBC # FLD: 14 % — SIGNIFICANT CHANGE UP (ref 10.3–14.5)
RBC # FLD: 14 % — SIGNIFICANT CHANGE UP (ref 10.3–14.5)
SODIUM SERPL-SCNC: 142 MMOL/L — SIGNIFICANT CHANGE UP (ref 135–145)
SODIUM SERPL-SCNC: 142 MMOL/L — SIGNIFICANT CHANGE UP (ref 135–145)
WBC # BLD: 10.09 K/UL — SIGNIFICANT CHANGE UP (ref 3.8–10.5)
WBC # BLD: 10.09 K/UL — SIGNIFICANT CHANGE UP (ref 3.8–10.5)
WBC # FLD AUTO: 10.09 K/UL — SIGNIFICANT CHANGE UP (ref 3.8–10.5)
WBC # FLD AUTO: 10.09 K/UL — SIGNIFICANT CHANGE UP (ref 3.8–10.5)

## 2023-10-25 PROCEDURE — C1776: CPT

## 2023-10-25 PROCEDURE — 97162 PT EVAL MOD COMPLEX 30 MIN: CPT

## 2023-10-25 PROCEDURE — 80048 BASIC METABOLIC PNL TOTAL CA: CPT

## 2023-10-25 PROCEDURE — 20985 CPTR-ASST DIR MS PX: CPT

## 2023-10-25 PROCEDURE — 97165 OT EVAL LOW COMPLEX 30 MIN: CPT

## 2023-10-25 PROCEDURE — 97116 GAIT TRAINING THERAPY: CPT

## 2023-10-25 PROCEDURE — 85027 COMPLETE CBC AUTOMATED: CPT

## 2023-10-25 PROCEDURE — 27447 TOTAL KNEE ARTHROPLASTY: CPT | Mod: RT

## 2023-10-25 PROCEDURE — 73560 X-RAY EXAM OF KNEE 1 OR 2: CPT

## 2023-10-25 PROCEDURE — C1713: CPT

## 2023-10-25 PROCEDURE — 82962 GLUCOSE BLOOD TEST: CPT

## 2023-10-25 PROCEDURE — 97530 THERAPEUTIC ACTIVITIES: CPT

## 2023-10-25 PROCEDURE — 36415 COLL VENOUS BLD VENIPUNCTURE: CPT

## 2023-11-29 RX ORDER — DAPAGLIFLOZIN 10 MG/1
1 TABLET, FILM COATED ORAL
Refills: 0 | DISCHARGE

## 2023-11-29 RX ORDER — SEMAGLUTIDE 0.68 MG/ML
1 INJECTION, SOLUTION SUBCUTANEOUS
Qty: 30 | Refills: 0
Start: 2023-11-29 | End: 2023-12-28

## 2023-11-29 RX ORDER — OLMESARTAN MEDOXOMIL 5 MG/1
1 TABLET, FILM COATED ORAL
Qty: 30 | Refills: 0
Start: 2023-11-29 | End: 2023-12-28

## 2023-11-29 RX ORDER — DAPAGLIFLOZIN 10 MG/1
1 TABLET, FILM COATED ORAL
Qty: 30 | Refills: 0
Start: 2023-11-29 | End: 2023-12-28

## 2023-11-29 RX ORDER — IBANDRONATE SODIUM 150 MG/1
1 TABLET ORAL
Qty: 1 | Refills: 0
Start: 2023-11-29 | End: 2023-12-28

## 2023-11-29 RX ORDER — ATORVASTATIN CALCIUM 80 MG/1
1 TABLET, FILM COATED ORAL
Refills: 0
Start: 2023-11-29

## 2023-11-29 RX ORDER — SEMAGLUTIDE 0.68 MG/ML
1 INJECTION, SOLUTION SUBCUTANEOUS
Refills: 0 | DISCHARGE

## 2023-11-29 RX ORDER — IBANDRONATE SODIUM 150 MG/1
1 TABLET ORAL
Refills: 0 | DISCHARGE

## 2023-11-29 RX ORDER — IBANDRONATE SODIUM 150 MG/1
1 TABLET ORAL
Refills: 0
Start: 2023-11-29

## 2023-11-29 RX ORDER — METFORMIN HYDROCHLORIDE 850 MG/1
1 TABLET ORAL
Refills: 0 | DISCHARGE

## 2023-11-29 RX ORDER — METFORMIN HYDROCHLORIDE 850 MG/1
1 TABLET ORAL
Qty: 30 | Refills: 0
Start: 2023-11-29 | End: 2023-12-28

## 2023-11-29 RX ORDER — ATORVASTATIN CALCIUM 80 MG/1
1 TABLET, FILM COATED ORAL
Qty: 15 | Refills: 0
Start: 2023-11-29 | End: 2023-12-28

## 2023-11-29 RX ORDER — METFORMIN HYDROCHLORIDE 850 MG/1
1 TABLET ORAL
Refills: 0
Start: 2023-11-29

## 2023-11-29 RX ORDER — DAPAGLIFLOZIN 10 MG/1
1 TABLET, FILM COATED ORAL
Refills: 0
Start: 2023-11-29

## 2023-11-29 RX ORDER — SEMAGLUTIDE 0.68 MG/ML
1 INJECTION, SOLUTION SUBCUTANEOUS
Refills: 0
Start: 2023-11-29

## 2023-11-29 RX ORDER — ATORVASTATIN CALCIUM 80 MG/1
1 TABLET, FILM COATED ORAL
Refills: 0 | DISCHARGE

## 2024-02-06 ENCOUNTER — APPOINTMENT (OUTPATIENT)
Dept: OTOLARYNGOLOGY | Facility: CLINIC | Age: 79
End: 2024-02-06

## 2024-02-06 ENCOUNTER — APPOINTMENT (OUTPATIENT)
Dept: OTOLARYNGOLOGY | Facility: CLINIC | Age: 79
End: 2024-02-06
Payer: MEDICARE

## 2024-02-06 ENCOUNTER — APPOINTMENT (OUTPATIENT)
Dept: PHARMACY | Facility: CLINIC | Age: 79
End: 2024-02-06
Payer: MEDICARE

## 2024-02-06 VITALS
SYSTOLIC BLOOD PRESSURE: 131 MMHG | DIASTOLIC BLOOD PRESSURE: 79 MMHG | HEIGHT: 61 IN | HEART RATE: 90 BPM | BODY MASS INDEX: 25.49 KG/M2 | WEIGHT: 135 LBS

## 2024-02-06 DIAGNOSIS — H73.001 ACUTE MYRINGITIS, RIGHT EAR: ICD-10-CM

## 2024-02-06 PROCEDURE — V5299A: CUSTOM

## 2024-02-06 PROCEDURE — 99213 OFFICE O/P EST LOW 20 MIN: CPT

## 2024-02-06 RX ORDER — CIPROFLOXACIN AND DEXAMETHASONE 3; 1 MG/ML; MG/ML
0.3-0.1 SUSPENSION/ DROPS AURICULAR (OTIC) TWICE DAILY
Qty: 1 | Refills: 0 | Status: ACTIVE | COMMUNITY
Start: 2024-02-06 | End: 1900-01-01

## 2024-02-06 NOTE — ASSESSMENT
[FreeTextEntry1] : Camila Malloy presents for follow-up. She wears bilateral hearing aids. Bilateral cerumen impaction was removed today. She has evidence of right sided acute myringitis. Audiogram at previous visit showed type C tymps AU and moderately severe to moderate sensorineural hearing loss AU, stable from prior. Will start ciprodex drops.  - Start ciprodex drops - 5 drops BID to right ear for 7 days. - Dry ear precautions. - f/u in 2 weeks w/ audio.

## 2024-02-06 NOTE — HISTORY OF PRESENT ILLNESS
[de-identified] : Camila Malloy is a 75 yo female with hx HTN and DM who presents for evaluation of hearing loss. She notes gradually worsening hearing loss over the past two years. She does not wear hearing aids. She denies tinnitus or vertigo. She denies otalgia or otorrhea. She denies history of recurrent ear infections. She denies recent fevers or chills. [FreeTextEntry1] : 5/27/22 - Ms. Malloy presents for follow-up. Since last visit, she obtained bilateral hearing aids. She has been seen by audiology and was noted to have cerumen impaction. She notes that she is having issues with her right hearing aid and feels decreased benefit from it. She denies otalgia, otorrhea, tinnitus, or vertigo. She dneies fevers or chills.  7/26/22 - Ms. Malloy presents for follow-up. She is wearing her hearing aids. She denies otalgia, otorrhea, tinnitus, vertigo, hearing change. She denies fevers, chills, or recent ear infections.  10/26/22 - Ms. Malloy presents for follow-up. She notes occasional issues with her hearing aids. She is going for hearing aid check. She notes difficulty hearing in crowded rooms. She denies otalgia, otorrhea, tinnitus ,vertigo. She denies fevers, chills, or recent ear infections.  2/22/23 - Ms. Malloy presnets for follow-up. She continues to wear her hearing aids. She notes some left ear soreness today, otherwise no otalgia. She denies otorrhea, tinnitus, vertigo. She denies hearing change. She denies fevers, chills, or recent ear infections.  8/22/23 - Camila Malloy presents for follow-up. She is wearing her hearing aids. She notes some bilateral tinnitus. She denies otalgia, otorrhea, vertigo, or change in hearing. No fevers or chills. No recent ear infections.  2/6/24 - Camila Malloy presents for follow-up. She is wearing hearing aids. She notes cerumen. She denies otalgia, otorrhea, vertigo, or hearing change. She denies tinnitus. No fevers, chills, or recent ear infections.

## 2024-02-06 NOTE — PHYSICAL EXAM
[de-identified] : Wearing bilateral hearing aids. Bilateral cerumen impaction [de-identified] : Erythema of right tympanic membrane, no effusion. left tympanic membrane intact with no effusion. [Midline] : trachea located in midline position [Normal] : no rashes

## 2024-02-22 ENCOUNTER — APPOINTMENT (OUTPATIENT)
Dept: OTOLARYNGOLOGY | Facility: CLINIC | Age: 79
End: 2024-02-22
Payer: MEDICARE

## 2024-02-22 PROCEDURE — 92567 TYMPANOMETRY: CPT

## 2024-02-22 PROCEDURE — 92557 COMPREHENSIVE HEARING TEST: CPT

## 2024-02-22 PROCEDURE — 99213 OFFICE O/P EST LOW 20 MIN: CPT

## 2024-02-22 NOTE — HISTORY OF PRESENT ILLNESS
[FreeTextEntry1] : 5/27/22 - Ms. Malloy presents for follow-up. Since last visit, she obtained bilateral hearing aids. She has been seen by audiology and was noted to have cerumen impaction. She notes that she is having issues with her right hearing aid and feels decreased benefit from it. She denies otalgia, otorrhea, tinnitus, or vertigo. She dneies fevers or chills.  7/26/22 - Ms. Malloy presents for follow-up. She is wearing her hearing aids. She denies otalgia, otorrhea, tinnitus, vertigo, hearing change. She denies fevers, chills, or recent ear infections.  10/26/22 - Ms. Malloy presents for follow-up. She notes occasional issues with her hearing aids. She is going for hearing aid check. She notes difficulty hearing in crowded rooms. She denies otalgia, otorrhea, tinnitus ,vertigo. She denies fevers, chills, or recent ear infections.  2/22/23 - Ms. Malloy presnets for follow-up. She continues to wear her hearing aids. She notes some left ear soreness today, otherwise no otalgia. She denies otorrhea, tinnitus, vertigo. She denies hearing change. She denies fevers, chills, or recent ear infections.  8/22/23 - Camila Malloy presents for follow-up. She is wearing her hearing aids. She notes some bilateral tinnitus. She denies otalgia, otorrhea, vertigo, or change in hearing. No fevers or chills. No recent ear infections.  2/6/24 - Camila Malloy presents for follow-up. She is wearing hearing aids. She notes cerumen. She denies otalgia, otorrhea, vertigo, or hearing change. She denies tinnitus. No fevers, chills, or recent ear infections.  2/22/24 - Ms. Malloy presents for follow-up. She completed ciprodex drops for myringitis. She denies otalgia, otorrhea, hearing change, tinnitus, vertigo. No fevers or chills. [de-identified] : Camila Malloy is a 77 yo female with hx HTN and DM who presents for evaluation of hearing loss. She notes gradually worsening hearing loss over the past two years. She does not wear hearing aids. She denies tinnitus or vertigo. She denies otalgia or otorrhea. She denies history of recurrent ear infections. She denies recent fevers or chills.

## 2024-02-22 NOTE — ASSESSMENT
[FreeTextEntry1] : Camila Malloy presents for follow-up. She wears bilateral hearing aids. She was treated for right acute myringits at last visit with ciprodex and this has resolved. Otoscopic exam is normal. Audiogram today showed type As/C tymp AD, type A tymp AS, and moderately severe to mild/moderate sensorineural hearing loss AU, essentially stable.  - Continue wearing hearing aids. - HAC - f/u in 1 year with audio.

## 2024-02-22 NOTE — DATA REVIEWED
[de-identified] : - TYPE A s/C TYMP IN THE RIGHT, TYPE A IN THE LEFT ESS MOD-SEVERE RISING TO MILD/MOD SNHL [id number] AU

## 2024-02-22 NOTE — PHYSICAL EXAM
[de-identified] : Wearing bilateral hearing aids.  [Midline] : trachea located in midline position [Normal] : no rashes

## 2024-02-26 ENCOUNTER — APPOINTMENT (OUTPATIENT)
Dept: PHARMACY | Facility: CLINIC | Age: 79
End: 2024-02-26
Payer: SELF-PAY

## 2024-02-26 PROCEDURE — V5299A: CUSTOM

## 2024-04-10 ENCOUNTER — APPOINTMENT (OUTPATIENT)
Dept: PHARMACY | Facility: CLINIC | Age: 79
End: 2024-04-10
Payer: SELF-PAY

## 2024-04-10 PROCEDURE — V5267C: CUSTOM

## 2024-04-10 PROCEDURE — V5267D: CUSTOM

## 2024-06-26 ENCOUNTER — APPOINTMENT (OUTPATIENT)
Dept: OTOLARYNGOLOGY | Facility: CLINIC | Age: 79
End: 2024-06-26
Payer: MEDICARE

## 2024-06-26 ENCOUNTER — APPOINTMENT (OUTPATIENT)
Dept: PHARMACY | Facility: CLINIC | Age: 79
End: 2024-06-26
Payer: SELF-PAY

## 2024-06-26 DIAGNOSIS — H61.23 IMPACTED CERUMEN, BILATERAL: ICD-10-CM

## 2024-06-26 DIAGNOSIS — H69.93 UNSPECIFIED EUSTACHIAN TUBE DISORDER, BILATERAL: ICD-10-CM

## 2024-06-26 DIAGNOSIS — H90.3 SENSORINEURAL HEARING LOSS, BILATERAL: ICD-10-CM

## 2024-06-26 DIAGNOSIS — H93.8X3 OTHER SPECIFIED DISORDERS OF EAR, BILATERAL: ICD-10-CM

## 2024-06-26 PROCEDURE — G0268 REMOVAL OF IMPACTED WAX MD: CPT

## 2024-06-26 PROCEDURE — 99213 OFFICE O/P EST LOW 20 MIN: CPT | Mod: 25

## 2024-06-26 PROCEDURE — V5299A: CUSTOM

## 2024-11-19 ENCOUNTER — APPOINTMENT (OUTPATIENT)
Dept: PHARMACY | Facility: CLINIC | Age: 79
End: 2024-11-19
Payer: SELF-PAY

## 2024-11-19 PROCEDURE — V5267D: CUSTOM

## 2025-02-19 ENCOUNTER — APPOINTMENT (OUTPATIENT)
Dept: PHARMACY | Facility: CLINIC | Age: 80
End: 2025-02-19

## 2025-02-19 ENCOUNTER — APPOINTMENT (OUTPATIENT)
Dept: OTOLARYNGOLOGY | Facility: CLINIC | Age: 80
End: 2025-02-19

## 2025-03-28 ENCOUNTER — APPOINTMENT (OUTPATIENT)
Dept: PHARMACY | Facility: CLINIC | Age: 80
End: 2025-03-28
Payer: MEDICARE

## 2025-03-28 ENCOUNTER — NON-APPOINTMENT (OUTPATIENT)
Age: 80
End: 2025-03-28

## 2025-03-28 ENCOUNTER — APPOINTMENT (OUTPATIENT)
Dept: OTOLARYNGOLOGY | Facility: CLINIC | Age: 80
End: 2025-03-28
Payer: MEDICARE

## 2025-03-28 VITALS
HEART RATE: 90 BPM | DIASTOLIC BLOOD PRESSURE: 70 MMHG | SYSTOLIC BLOOD PRESSURE: 121 MMHG | BODY MASS INDEX: 26.62 KG/M2 | WEIGHT: 141 LBS | HEIGHT: 61 IN

## 2025-03-28 DIAGNOSIS — H61.23 IMPACTED CERUMEN, BILATERAL: ICD-10-CM

## 2025-03-28 DIAGNOSIS — H90.3 SENSORINEURAL HEARING LOSS, BILATERAL: ICD-10-CM

## 2025-03-28 PROCEDURE — G0268 REMOVAL OF IMPACTED WAX MD: CPT

## 2025-03-28 PROCEDURE — 92567 TYMPANOMETRY: CPT

## 2025-03-28 PROCEDURE — 92557 COMPREHENSIVE HEARING TEST: CPT

## 2025-03-28 PROCEDURE — V5299A: CUSTOM

## 2025-03-28 PROCEDURE — 99213 OFFICE O/P EST LOW 20 MIN: CPT | Mod: 25

## 2025-08-19 ENCOUNTER — APPOINTMENT (OUTPATIENT)
Dept: PHARMACY | Facility: CLINIC | Age: 80
End: 2025-08-19
Payer: SELF-PAY

## 2025-08-19 PROCEDURE — V5267C: CUSTOM

## 2025-08-19 PROCEDURE — V5267D: CUSTOM

## (undated) DEVICE — DRAPE 3/4 SHEET W REINFORCEMENT 56X77"

## (undated) DEVICE — DRAPE IOBAN 23" X 23"

## (undated) DEVICE — SOL IRR BAG NS 0.9% 3000ML

## (undated) DEVICE — PLV/PSP-ESU FORCEFX F8I7418A: Type: DURABLE MEDICAL EQUIPMENT

## (undated) DEVICE — HOOD T5 PEELAWAY

## (undated) DEVICE — ORTHOALIGN PLUS UNIT

## (undated) DEVICE — SPECIMEN CONTAINER 4OZ

## (undated) DEVICE — GOWN SMARTGOWN RAGLAN XLG

## (undated) DEVICE — DRSG AQUACEL 3.5 X 10"

## (undated) DEVICE — PLV-STRYKER SYSTEM 8: Type: DURABLE MEDICAL EQUIPMENT

## (undated) DEVICE — SUCTION YANKAUER NO CONTROL VENT

## (undated) DEVICE — ELCTR AQUAMANTYS BIPOLAR SEALER 6.0

## (undated) DEVICE — SOL INJ NS 0.9% 100ML

## (undated) DEVICE — NDL HYPO SAFE 22G X 1.5" (BLACK)

## (undated) DEVICE — SAW BLADE STRYKER RECIPROCATING DOUBLE SIDED 70X12.5X1MM

## (undated) DEVICE — GLV 8.5 PROTEXIS (BLUE)

## (undated) DEVICE — NDL HYPO SAFE 18G X 1.5" (PINK)

## (undated) DEVICE — WARMING BLANKET UPPER ADULT

## (undated) DEVICE — SOL IRR POUR H2O 1000ML

## (undated) DEVICE — PLV/PSP-TOURNIQUET #12 40150710004: Type: DURABLE MEDICAL EQUIPMENT

## (undated) DEVICE — SYR LUER LOK 50CC

## (undated) DEVICE — HOOD FLYTE STRYKER HELMET SHIELD

## (undated) DEVICE — SUT VLOC 180 3-0 18" P-12 UNDYED

## (undated) DEVICE — SUT BIOSYN 2-0 27" GS-21

## (undated) DEVICE — DRAPE INSTRUMENT POUCH 6.75" X 11"

## (undated) DEVICE — DRSG COBAN 4"

## (undated) DEVICE — PACK TOTAL KNEE

## (undated) DEVICE — SAW BLADE STRYKER SAGITTAL DUAL CUT 25X90X1.27MM

## (undated) DEVICE — DRSG DERMABOND MINI

## (undated) DEVICE — DRAPE XRAY CASSETTE COVER DOUBLE 20X40"

## (undated) DEVICE — SUT POLYSORB 1 27" GS-12 UNDYED

## (undated) DEVICE — SUT BIOSYN 2-0 27" GS-11

## (undated) DEVICE — GLV 8 PROTEXIS ORTHO (CREAM)

## (undated) DEVICE — SYR LUER LOK 20CC

## (undated) DEVICE — SUT BIOSYN 2-0 30" C-14 UNDYED

## (undated) DEVICE — GLV 8 PROTEXIS (WHITE)

## (undated) DEVICE — TOURNIQUET CUFF 30" DUAL PORT W PLC

## (undated) DEVICE — TOURNIQUET CUFF 34" DUAL PORT W PLC

## (undated) DEVICE — SAW BLADE STRYKER SAGITTAL CUTTING 25.0X9.0X1.07MM